# Patient Record
Sex: MALE | Race: BLACK OR AFRICAN AMERICAN | NOT HISPANIC OR LATINO | URBAN - METROPOLITAN AREA
[De-identification: names, ages, dates, MRNs, and addresses within clinical notes are randomized per-mention and may not be internally consistent; named-entity substitution may affect disease eponyms.]

---

## 2019-03-13 ENCOUNTER — EMERGENCY (EMERGENCY)
Facility: HOSPITAL | Age: 59
LOS: 1 days | Discharge: HOME | End: 2019-03-13
Attending: EMERGENCY MEDICINE | Admitting: EMERGENCY MEDICINE
Payer: MEDICAID

## 2019-03-13 VITALS
TEMPERATURE: 97 F | RESPIRATION RATE: 18 BRPM | DIASTOLIC BLOOD PRESSURE: 101 MMHG | SYSTOLIC BLOOD PRESSURE: 178 MMHG | OXYGEN SATURATION: 99 % | WEIGHT: 171.3 LBS | HEART RATE: 60 BPM

## 2019-03-13 DIAGNOSIS — Z90.49 ACQUIRED ABSENCE OF OTHER SPECIFIED PARTS OF DIGESTIVE TRACT: Chronic | ICD-10-CM

## 2019-03-13 DIAGNOSIS — F32.9 MAJOR DEPRESSIVE DISORDER, SINGLE EPISODE, UNSPECIFIED: ICD-10-CM

## 2019-03-13 DIAGNOSIS — N20.0 CALCULUS OF KIDNEY: ICD-10-CM

## 2019-03-13 DIAGNOSIS — F17.210 NICOTINE DEPENDENCE, CIGARETTES, UNCOMPLICATED: ICD-10-CM

## 2019-03-13 DIAGNOSIS — R10.31 RIGHT LOWER QUADRANT PAIN: ICD-10-CM

## 2019-03-13 DIAGNOSIS — E78.5 HYPERLIPIDEMIA, UNSPECIFIED: ICD-10-CM

## 2019-03-13 PROCEDURE — 74176 CT ABD & PELVIS W/O CONTRAST: CPT

## 2019-03-13 PROCEDURE — 96372 THER/PROPH/DIAG INJ SC/IM: CPT

## 2019-03-13 PROCEDURE — 85610 PROTHROMBIN TIME: CPT

## 2019-03-13 PROCEDURE — 87491 CHLMYD TRACH DNA AMP PROBE: CPT

## 2019-03-13 PROCEDURE — 87591 N.GONORRHOEAE DNA AMP PROB: CPT

## 2019-03-13 PROCEDURE — 36415 COLL VENOUS BLD VENIPUNCTURE: CPT

## 2019-03-13 PROCEDURE — 87389 HIV-1 AG W/HIV-1&-2 AB AG IA: CPT

## 2019-03-13 PROCEDURE — 74176 CT ABD & PELVIS W/O CONTRAST: CPT | Mod: 26

## 2019-03-13 PROCEDURE — 85730 THROMBOPLASTIN TIME PARTIAL: CPT

## 2019-03-13 PROCEDURE — 81003 URINALYSIS AUTO W/O SCOPE: CPT

## 2019-03-13 PROCEDURE — 80053 COMPREHEN METABOLIC PANEL: CPT

## 2019-03-13 PROCEDURE — 99284 EMERGENCY DEPT VISIT MOD MDM: CPT | Mod: 25

## 2019-03-13 PROCEDURE — 99284 EMERGENCY DEPT VISIT MOD MDM: CPT

## 2019-03-13 PROCEDURE — 87086 URINE CULTURE/COLONY COUNT: CPT

## 2019-03-13 PROCEDURE — 85025 COMPLETE CBC W/AUTO DIFF WBC: CPT

## 2019-03-13 RX ORDER — CEFTRIAXONE 500 MG/1
250 INJECTION, POWDER, FOR SOLUTION INTRAMUSCULAR; INTRAVENOUS ONCE
Qty: 0 | Refills: 0 | Status: COMPLETED | OUTPATIENT
Start: 2019-03-13 | End: 2019-03-13

## 2019-03-13 RX ORDER — SODIUM CHLORIDE 9 MG/ML
1000 INJECTION INTRAMUSCULAR; INTRAVENOUS; SUBCUTANEOUS ONCE
Qty: 0 | Refills: 0 | Status: COMPLETED | OUTPATIENT
Start: 2019-03-13 | End: 2019-03-13

## 2019-03-13 RX ORDER — AZITHROMYCIN 500 MG/1
1000 TABLET, FILM COATED ORAL ONCE
Qty: 0 | Refills: 0 | Status: COMPLETED | OUTPATIENT
Start: 2019-03-13 | End: 2019-03-13

## 2019-03-13 RX ADMIN — SODIUM CHLORIDE 1000 MILLILITER(S): 9 INJECTION INTRAMUSCULAR; INTRAVENOUS; SUBCUTANEOUS at 18:02

## 2019-03-13 RX ADMIN — CEFTRIAXONE 250 MILLIGRAM(S): 500 INJECTION, POWDER, FOR SOLUTION INTRAMUSCULAR; INTRAVENOUS at 19:55

## 2019-03-13 RX ADMIN — AZITHROMYCIN 1000 MILLIGRAM(S): 500 TABLET, FILM COATED ORAL at 19:55

## 2019-03-13 NOTE — ED ADULT NURSE NOTE - NSIMPLEMENTINTERV_GEN_ALL_ED
Implemented All Universal Safety Interventions:  [place] to call system. Call bell, personal items and telephone within reach. Instruct patient to call for assistance. Room bathroom lighting operational. Non-slip footwear when patient is off stretcher. Physically safe environment: no spills, clutter or unnecessary equipment. Stretcher in lowest position, wheels locked, appropriate side rails in place.

## 2019-03-13 NOTE — ED PROVIDER NOTE - OBJECTIVE STATEMENT
57 y/o male with PMH of prostatectomy, 2 MIs, appendectomy (age 4), TIA presents to ED with 2 wks of LRQ pain. About 2 wks ago, the patient had unprotected sex with a women. The next day, the patient started having abdominal pain, LRQ, radiating to his right testicle and R flank. It has been getting worse. It is constant, 7-8/10, dull pain, worse when laying down and better when he's walking upright. He tried aleve once, which minimally helped. He denies fever, chills, n/v/d, cough. He has increased urinary frequency and has been trying to drink more water. He denies burning or urethral discharge, but does have mild itching of the urethra.     The patient also reports feeling down for the past month. He has decreased appetite, sleep and has been unable to leave his apartment. He stopped showing up for work, and was terminated from his job because of it. He has been unable to enjoy his old hobbies and does not want to talk 59 y/o male with PMH of prostatectomy, 2 MIs, appendectomy (age 4), TIA presents to ED with 2 wks of LRQ pain. About 2 wks ago, the patient had unprotected sex with a women. The next day, the patient started having abdominal pain, LRQ, radiating to his right testicle and R flank. It has been getting worse. It is constant, 7-8/10, dull pain, worse when laying down and better when he's walking upright. He tried aleve once, which minimally helped. He denies fever, chills, n/v/d, cough. He has increased urinary frequency and has been trying to drink more water. He denies burning or urethral discharge, but does have mild itching of the urethra.     The patient also reports feeling down for the past month. He has decreased appetite, sleep and has been unable to leave his apartment. He stopped showing up for work, and was terminated from his job because of it. He has been unable to enjoy his old hobbies and does not want to talk to any of his friends. He denies any suicidal/homocidal ideation.

## 2019-03-13 NOTE — ED PROVIDER NOTE - CHPI ED SYMPTOMS NEG
no abdominal distension/no vomiting/no burning urination/no chills/no blood in stool/no fever/no diarrhea/no nausea

## 2019-03-13 NOTE — ED ADULT NURSE NOTE - OBJECTIVE STATEMENT
Pt. c/o worsening R groin pain radiating to R flank and R testicle and urinary frequency x 2 weeks after having unprotected vaginal sex. Pt. has Hx of PID w/ similar Sx. Pain is constant, pt. last took alleve two days ago w/ partial relief, pain worsens w/ supine positioning, relieved w/ sitting upright or walking. Pt. denies any swelling or erythema of genital area, denies penile discharge. Pt. denies hematuria, burning on urination. Denies N&V&D. Denies fever, chills, body aches. Abd soft, non-tender, non-distended. R groin and R flank tender on palpation. Pt. also reports depression for past month, denies SI or HI. MD Bhagat aware. Pt. states he stopped showing up to work and was recently terminated, has been dis-interested in activities. Pt. has also been noncompliant w/ HTN meds x 3 months.

## 2019-03-13 NOTE — ED PROVIDER NOTE - NSFOLLOWUPCLINICS_GEN_ALL_ED_FT
[facility]  Psychiatry  [address] [place] [address]  Phone: [phone]  Fax:   Follow Up Time:

## 2019-03-13 NOTE — ED ADULT TRIAGE NOTE - CHIEF COMPLAINT QUOTE
pt c/o right flank pain, radiating to the groin and right testicle x 2 weeks. pt reports having unprotected sex prior to symptoms began. also feeling chills and warm but didn't measure his temp at home. denies burning/ blood in urination. pt hypertensive on triage. ekf in progress. endorsed not taking his BP meds for about a month.

## 2019-03-13 NOTE — ED PROVIDER NOTE - PMH
Carpal tunnel syndrome  R hand  Hyperlipidemia    Hypertension Carpal tunnel syndrome  R hand  Hyperlipidemia    Hypertension    Myocardial infarction, unspecified MI type, unspecified artery    TIA (transient ischemic attack)

## 2019-03-15 LAB
C TRACH RRNA SPEC QL NAA+PROBE: SIGNIFICANT CHANGE UP
N GONORRHOEA RRNA SPEC QL NAA+PROBE: SIGNIFICANT CHANGE UP
SPECIMEN SOURCE: SIGNIFICANT CHANGE UP

## 2019-09-16 ENCOUNTER — EMERGENCY (EMERGENCY)
Facility: HOSPITAL | Age: 59
LOS: 0 days | Discharge: HOME | End: 2019-09-16
Attending: EMERGENCY MEDICINE
Payer: MEDICAID

## 2019-09-16 VITALS
RESPIRATION RATE: 14 BRPM | DIASTOLIC BLOOD PRESSURE: 74 MMHG | SYSTOLIC BLOOD PRESSURE: 125 MMHG | OXYGEN SATURATION: 98 % | HEART RATE: 61 BPM | TEMPERATURE: 98 F

## 2019-09-16 VITALS
RESPIRATION RATE: 19 BRPM | HEIGHT: 66 IN | SYSTOLIC BLOOD PRESSURE: 116 MMHG | HEART RATE: 56 BPM | TEMPERATURE: 98 F | DIASTOLIC BLOOD PRESSURE: 75 MMHG | OXYGEN SATURATION: 100 % | WEIGHT: 134.92 LBS

## 2019-09-16 DIAGNOSIS — R07.9 CHEST PAIN, UNSPECIFIED: ICD-10-CM

## 2019-09-16 DIAGNOSIS — Z90.49 ACQUIRED ABSENCE OF OTHER SPECIFIED PARTS OF DIGESTIVE TRACT: Chronic | ICD-10-CM

## 2019-09-16 DIAGNOSIS — I10 ESSENTIAL (PRIMARY) HYPERTENSION: ICD-10-CM

## 2019-09-16 DIAGNOSIS — F17.200 NICOTINE DEPENDENCE, UNSPECIFIED, UNCOMPLICATED: ICD-10-CM

## 2019-09-16 DIAGNOSIS — I50.9 HEART FAILURE, UNSPECIFIED: ICD-10-CM

## 2019-09-16 DIAGNOSIS — F19.19 OTHER PSYCHOACTIVE SUBSTANCE ABUSE WITH UNSPECIFIED PSYCHOACTIVE SUBSTANCE-INDUCED DISORDER: ICD-10-CM

## 2019-09-16 DIAGNOSIS — R07.89 OTHER CHEST PAIN: ICD-10-CM

## 2019-09-16 DIAGNOSIS — J45.909 UNSPECIFIED ASTHMA, UNCOMPLICATED: ICD-10-CM

## 2019-09-16 PROBLEM — G56.00 CARPAL TUNNEL SYNDROME, UNSPECIFIED UPPER LIMB: Chronic | Status: ACTIVE | Noted: 2019-03-13

## 2019-09-16 PROBLEM — G45.9 TRANSIENT CEREBRAL ISCHEMIC ATTACK, UNSPECIFIED: Chronic | Status: ACTIVE | Noted: 2019-03-13

## 2019-09-16 PROBLEM — E78.5 HYPERLIPIDEMIA, UNSPECIFIED: Chronic | Status: ACTIVE | Noted: 2019-03-13

## 2019-09-16 PROBLEM — I21.9 ACUTE MYOCARDIAL INFARCTION, UNSPECIFIED: Chronic | Status: ACTIVE | Noted: 2019-03-13

## 2019-09-16 LAB
ALBUMIN SERPL ELPH-MCNC: 3 G/DL — LOW (ref 3.3–5)
ALP SERPL-CCNC: 57 U/L — SIGNIFICANT CHANGE UP (ref 40–120)
ALT FLD-CCNC: 13 U/L — SIGNIFICANT CHANGE UP (ref 12–78)
AMPHET UR-MCNC: NEGATIVE — SIGNIFICANT CHANGE UP
ANION GAP SERPL CALC-SCNC: 6 MMOL/L — SIGNIFICANT CHANGE UP (ref 5–17)
APTT BLD: 29.3 SEC — SIGNIFICANT CHANGE UP (ref 28.5–37)
AST SERPL-CCNC: 18 U/L — SIGNIFICANT CHANGE UP (ref 15–37)
BARBITURATES UR SCN-MCNC: NEGATIVE — SIGNIFICANT CHANGE UP
BENZODIAZ UR-MCNC: NEGATIVE — SIGNIFICANT CHANGE UP
BILIRUB SERPL-MCNC: 0.3 MG/DL — SIGNIFICANT CHANGE UP (ref 0.2–1.2)
BUN SERPL-MCNC: 19 MG/DL — SIGNIFICANT CHANGE UP (ref 7–23)
CALCIUM SERPL-MCNC: 8.1 MG/DL — LOW (ref 8.5–10.1)
CHLORIDE SERPL-SCNC: 109 MMOL/L — HIGH (ref 96–108)
CK MB BLD-MCNC: <0.5 % — SIGNIFICANT CHANGE UP (ref 0–3.5)
CK MB CFR SERPL CALC: <1 NG/ML — SIGNIFICANT CHANGE UP (ref 0.5–3.6)
CK SERPL-CCNC: 202 U/L — SIGNIFICANT CHANGE UP (ref 26–308)
CO2 SERPL-SCNC: 26 MMOL/L — SIGNIFICANT CHANGE UP (ref 22–31)
COCAINE METAB.OTHER UR-MCNC: POSITIVE — SIGNIFICANT CHANGE UP
CREAT SERPL-MCNC: 1.71 MG/DL — HIGH (ref 0.5–1.3)
ETHANOL SERPL-MCNC: <10 MG/DL — SIGNIFICANT CHANGE UP (ref 0–10)
GLUCOSE SERPL-MCNC: 96 MG/DL — SIGNIFICANT CHANGE UP (ref 70–99)
HCT VFR BLD CALC: 37.6 % — LOW (ref 39–50)
HGB BLD-MCNC: 11.9 G/DL — LOW (ref 13–17)
INR BLD: 1.14 RATIO — SIGNIFICANT CHANGE UP (ref 0.88–1.16)
MCHC RBC-ENTMCNC: 26.4 PG — LOW (ref 27–34)
MCHC RBC-ENTMCNC: 31.6 GM/DL — LOW (ref 32–36)
MCV RBC AUTO: 83.6 FL — SIGNIFICANT CHANGE UP (ref 80–100)
METHADONE UR-MCNC: NEGATIVE — SIGNIFICANT CHANGE UP
NRBC # BLD: 0 /100 WBCS — SIGNIFICANT CHANGE UP (ref 0–0)
NT-PROBNP SERPL-SCNC: 95 PG/ML — SIGNIFICANT CHANGE UP (ref 0–125)
OPIATES UR-MCNC: NEGATIVE — SIGNIFICANT CHANGE UP
PCP SPEC-MCNC: SIGNIFICANT CHANGE UP
PCP UR-MCNC: NEGATIVE — SIGNIFICANT CHANGE UP
PLATELET # BLD AUTO: 250 K/UL — SIGNIFICANT CHANGE UP (ref 150–400)
POTASSIUM SERPL-MCNC: 3.8 MMOL/L — SIGNIFICANT CHANGE UP (ref 3.5–5.3)
POTASSIUM SERPL-SCNC: 3.8 MMOL/L — SIGNIFICANT CHANGE UP (ref 3.5–5.3)
PROT SERPL-MCNC: 6.7 GM/DL — SIGNIFICANT CHANGE UP (ref 6–8.3)
PROTHROM AB SERPL-ACNC: 12.8 SEC — SIGNIFICANT CHANGE UP (ref 10–12.9)
RBC # BLD: 4.5 M/UL — SIGNIFICANT CHANGE UP (ref 4.2–5.8)
RBC # FLD: 13.9 % — SIGNIFICANT CHANGE UP (ref 10.3–14.5)
SODIUM SERPL-SCNC: 141 MMOL/L — SIGNIFICANT CHANGE UP (ref 135–145)
THC UR QL: NEGATIVE — SIGNIFICANT CHANGE UP
TROPONIN I SERPL-MCNC: <.015 NG/ML — SIGNIFICANT CHANGE UP (ref 0.01–0.04)
TROPONIN I SERPL-MCNC: <.015 NG/ML — SIGNIFICANT CHANGE UP (ref 0.01–0.04)
WBC # BLD: 7.3 K/UL — SIGNIFICANT CHANGE UP (ref 3.8–10.5)
WBC # FLD AUTO: 7.3 K/UL — SIGNIFICANT CHANGE UP (ref 3.8–10.5)

## 2019-09-16 PROCEDURE — 99285 EMERGENCY DEPT VISIT HI MDM: CPT

## 2019-09-16 PROCEDURE — 71045 X-RAY EXAM CHEST 1 VIEW: CPT | Mod: 26

## 2019-09-16 PROCEDURE — 93010 ELECTROCARDIOGRAM REPORT: CPT

## 2019-09-16 RX ORDER — IPRATROPIUM/ALBUTEROL SULFATE 18-103MCG
3 AEROSOL WITH ADAPTER (GRAM) INHALATION
Refills: 0 | Status: COMPLETED | OUTPATIENT
Start: 2019-09-16 | End: 2019-09-16

## 2019-09-16 RX ORDER — METOCLOPRAMIDE HCL 10 MG
10 TABLET ORAL ONCE
Refills: 0 | Status: COMPLETED | OUTPATIENT
Start: 2019-09-16 | End: 2019-09-16

## 2019-09-16 RX ORDER — ASPIRIN/CALCIUM CARB/MAGNESIUM 324 MG
325 TABLET ORAL ONCE
Refills: 0 | Status: COMPLETED | OUTPATIENT
Start: 2019-09-16 | End: 2019-09-16

## 2019-09-16 RX ORDER — ALBUTEROL 90 UG/1
2 AEROSOL, METERED ORAL
Qty: 1 | Refills: 0
Start: 2019-09-16 | End: 2019-10-15

## 2019-09-16 RX ORDER — PANTOPRAZOLE SODIUM 20 MG/1
40 TABLET, DELAYED RELEASE ORAL ONCE
Refills: 0 | Status: COMPLETED | OUTPATIENT
Start: 2019-09-16 | End: 2019-09-16

## 2019-09-16 RX ADMIN — Medication 3 MILLILITER(S): at 07:50

## 2019-09-16 RX ADMIN — Medication 3 MILLILITER(S): at 07:40

## 2019-09-16 RX ADMIN — Medication 10 MILLIGRAM(S): at 08:10

## 2019-09-16 RX ADMIN — Medication 325 MILLIGRAM(S): at 08:10

## 2019-09-16 RX ADMIN — Medication 125 MILLIGRAM(S): at 08:10

## 2019-09-16 RX ADMIN — PANTOPRAZOLE SODIUM 40 MILLIGRAM(S): 20 TABLET, DELAYED RELEASE ORAL at 08:10

## 2019-09-16 RX ADMIN — Medication 3 MILLILITER(S): at 08:16

## 2019-09-16 NOTE — ED PROVIDER NOTE - OBJECTIVE STATEMENT
59 years old male here c/o nasal congestions productive coughs, sharp chest pain with cough wheezing for 3 days. increases wheezing this morning. Pt sts he feels like this asthma attack in coming. Pt also sts he has hx of hypertension, chf, MI, but not seeing a pmd not taking any medications for 6 months. Pt also admits using cracks this past weekend last use was 2 days ago. Pt denies recent trauma, headache, dizziness, blurred visions, lights sensitivities, neck/back pain, focal/distal weakness or numbness, nausea, vomiting, fever, chills, abd pain, dysuria or irregular bowel movements. Pt also c/o note able to sleep for last couple of days.

## 2019-09-16 NOTE — ED PROVIDER NOTE - PMH
Asthma    CAD (coronary artery disease)    CHF (congestive heart failure)    Hypertension    Substance abuse

## 2019-09-16 NOTE — ED PROVIDER NOTE - PATIENT PORTAL LINK FT
You can access the FollowMyHealth Patient Portal offered by [facility] by registering at the following website: [email or web address]. By joining [employer]’s FollowMyHealth portal, you will also be able to view your health information using other applications (apps) compatible with our system.

## 2019-09-16 NOTE — ED PROVIDER NOTE - PROGRESS NOTE DETAILS
Pt is alert and oriented x 3 sts he is breathing much better now no expiratory wheezing bilaterally denies headache, dizziness, sob, chest pain, nausea, vomiting, abd pain. Pt is eating and tolerating breakfast. 2nd tropo is pending at 11:00 am. tropo x 2 negative. Pt has been very comfortable ate and tolerated lunch now Pt is given and explained all test reports and advised to follow up with Dr. Chaparro and return if symptoms persist or worsen. breath sounds are clear equal bilaterally.

## 2019-09-16 NOTE — ED PROVIDER NOTE - CONSTITUTIONAL, MLM
normal... Well appearing, well nourished, awake, alert, oriented to person, place, time/situation and in no apparent distress. Speaking in clear full sentences no nasal flaring no shoulders retractions no diaphoresis, not holding his head/chest/abdomen, appears comfortable sitting up in the stretcher in a bright light room

## 2019-09-16 NOTE — ED ADULT NURSE REASSESSMENT NOTE - NS ED NURSE REASSESS COMMENT FT1
Assuming care from previous RN Alicia, pt is A&OX4, pt safety maintained, denies SOB, resp even and unlabored, skin warm and dry, color is normal for race, denies pain/n/v, pt aware of plan of care and proficiency determined by successful teach back demonstration. Pt does not appear to be in any distress at this time.

## 2019-09-16 NOTE — ED ADULT TRIAGE NOTE - CHIEF COMPLAINT QUOTE
Patient BIBA: patient reports "chest pain and cough for 3 days. I have been smoking a copious amount of crack. My throat and head hurt too."

## 2019-09-16 NOTE — ED PROVIDER NOTE - NONTENDER LOCATION
left upper quadrant/right upper quadrant/umbilical/periumbilical/left costovertebral angle/left lower quadrant/suprapubic/right costovertebral angle/right lower quadrant

## 2019-09-16 NOTE — ED PROVIDER NOTE - CARE PLAN
Principal Discharge DX:	Asthma  Secondary Diagnosis:	Chest pain, unspecified  Secondary Diagnosis:	Substance abuse

## 2019-09-16 NOTE — ED ADULT NURSE NOTE - HOW OFTEN DO YOU HAVE SIX OR MORE DRINKS ON ONE OCCASION?
Never Thalidomide Counseling: I discussed with the patient the risks of thalidomide including but not limited to birth defects, anxiety, weakness, chest pain, dizziness, cough and severe allergy.

## 2019-09-16 NOTE — ED ADULT NURSE NOTE - OBJECTIVE STATEMENT
Pt presents w/ complaints of shooting sternal chest pain onset 3 hours ago. History of CVA 4 years Pt presents w/ complaints of shooting sternal chest pain onset 3 hours ago. History of CVA 4 years ago, asthma and CHF. Pt reports that his chest pain is aggravated by coughing. He also reports stuffy nose and headache

## 2019-10-01 ENCOUNTER — OUTPATIENT (OUTPATIENT)
Dept: OUTPATIENT SERVICES | Facility: HOSPITAL | Age: 59
LOS: 1 days | End: 2019-10-01
Payer: MEDICAID

## 2019-10-01 DIAGNOSIS — Z90.49 ACQUIRED ABSENCE OF OTHER SPECIFIED PARTS OF DIGESTIVE TRACT: Chronic | ICD-10-CM

## 2019-10-01 PROCEDURE — G9001: CPT

## 2019-10-11 DIAGNOSIS — Z71.89 OTHER SPECIFIED COUNSELING: ICD-10-CM

## 2019-10-11 PROBLEM — I50.9 HEART FAILURE, UNSPECIFIED: Chronic | Status: ACTIVE | Noted: 2019-09-16

## 2019-10-11 PROBLEM — F19.10 OTHER PSYCHOACTIVE SUBSTANCE ABUSE, UNCOMPLICATED: Chronic | Status: ACTIVE | Noted: 2019-09-16

## 2019-10-11 PROBLEM — I25.10 ATHEROSCLEROTIC HEART DISEASE OF NATIVE CORONARY ARTERY WITHOUT ANGINA PECTORIS: Chronic | Status: ACTIVE | Noted: 2019-09-16

## 2019-10-11 PROBLEM — J45.909 UNSPECIFIED ASTHMA, UNCOMPLICATED: Chronic | Status: ACTIVE | Noted: 2019-09-16

## 2019-10-11 PROBLEM — I10 ESSENTIAL (PRIMARY) HYPERTENSION: Chronic | Status: ACTIVE | Noted: 2019-09-16

## 2020-05-16 ENCOUNTER — EMERGENCY (EMERGENCY)
Facility: HOSPITAL | Age: 60
LOS: 0 days | Discharge: PSYCHIATRIC OR SUBSTANCE USE FACILITY | End: 2020-05-17
Attending: EMERGENCY MEDICINE
Payer: MEDICAID

## 2020-05-16 VITALS
TEMPERATURE: 97 F | WEIGHT: 149.91 LBS | HEART RATE: 50 BPM | SYSTOLIC BLOOD PRESSURE: 119 MMHG | HEIGHT: 66 IN | RESPIRATION RATE: 16 BRPM | DIASTOLIC BLOOD PRESSURE: 69 MMHG | OXYGEN SATURATION: 99 %

## 2020-05-16 DIAGNOSIS — Z90.49 ACQUIRED ABSENCE OF OTHER SPECIFIED PARTS OF DIGESTIVE TRACT: Chronic | ICD-10-CM

## 2020-05-16 LAB
ALBUMIN SERPL ELPH-MCNC: 3 G/DL — LOW (ref 3.3–5)
ALP SERPL-CCNC: 62 U/L — SIGNIFICANT CHANGE UP (ref 40–120)
ALT FLD-CCNC: 18 U/L — SIGNIFICANT CHANGE UP (ref 12–78)
AMPHET UR-MCNC: NEGATIVE — SIGNIFICANT CHANGE UP
ANION GAP SERPL CALC-SCNC: 9 MMOL/L — SIGNIFICANT CHANGE UP (ref 5–17)
APAP SERPL-MCNC: < 2 UG/ML (ref 10–30)
APPEARANCE UR: CLEAR — SIGNIFICANT CHANGE UP
AST SERPL-CCNC: 18 U/L — SIGNIFICANT CHANGE UP (ref 15–37)
BACTERIA # UR AUTO: ABNORMAL
BARBITURATES UR SCN-MCNC: NEGATIVE — SIGNIFICANT CHANGE UP
BASOPHILS # BLD AUTO: 0.02 K/UL — SIGNIFICANT CHANGE UP (ref 0–0.2)
BASOPHILS NFR BLD AUTO: 0.4 % — SIGNIFICANT CHANGE UP (ref 0–2)
BENZODIAZ UR-MCNC: NEGATIVE — SIGNIFICANT CHANGE UP
BILIRUB SERPL-MCNC: 0.3 MG/DL — SIGNIFICANT CHANGE UP (ref 0.2–1.2)
BILIRUB UR-MCNC: NEGATIVE — SIGNIFICANT CHANGE UP
BUN SERPL-MCNC: 21 MG/DL — SIGNIFICANT CHANGE UP (ref 7–23)
CALCIUM SERPL-MCNC: 8.4 MG/DL — LOW (ref 8.5–10.1)
CHLORIDE SERPL-SCNC: 107 MMOL/L — SIGNIFICANT CHANGE UP (ref 96–108)
CO2 SERPL-SCNC: 24 MMOL/L — SIGNIFICANT CHANGE UP (ref 22–31)
COCAINE METAB.OTHER UR-MCNC: POSITIVE — SIGNIFICANT CHANGE UP
COLOR SPEC: YELLOW — SIGNIFICANT CHANGE UP
COMMENT - URINE: SIGNIFICANT CHANGE UP
CREAT SERPL-MCNC: 1.78 MG/DL — HIGH (ref 0.5–1.3)
DIFF PNL FLD: NEGATIVE — SIGNIFICANT CHANGE UP
EOSINOPHIL # BLD AUTO: 0.23 K/UL — SIGNIFICANT CHANGE UP (ref 0–0.5)
EOSINOPHIL NFR BLD AUTO: 4.1 % — SIGNIFICANT CHANGE UP (ref 0–6)
EPI CELLS # UR: SIGNIFICANT CHANGE UP
ETHANOL SERPL-MCNC: <10 MG/DL — SIGNIFICANT CHANGE UP (ref 0–10)
GLUCOSE SERPL-MCNC: 144 MG/DL — HIGH (ref 70–99)
GLUCOSE UR QL: 100 MG/DL
GRAN CASTS # UR COMP ASSIST: ABNORMAL /LPF
HCT VFR BLD CALC: 40.8 % — SIGNIFICANT CHANGE UP (ref 39–50)
HGB BLD-MCNC: 13 G/DL — SIGNIFICANT CHANGE UP (ref 13–17)
HIV 1 & 2 AB SERPL IA.RAPID: SIGNIFICANT CHANGE UP
HYALINE CASTS # UR AUTO: ABNORMAL /LPF
IMM GRANULOCYTES NFR BLD AUTO: 0.2 % — SIGNIFICANT CHANGE UP (ref 0–1.5)
KETONES UR-MCNC: NEGATIVE — SIGNIFICANT CHANGE UP
LEUKOCYTE ESTERASE UR-ACNC: NEGATIVE — SIGNIFICANT CHANGE UP
LYMPHOCYTES # BLD AUTO: 1.75 K/UL — SIGNIFICANT CHANGE UP (ref 1–3.3)
LYMPHOCYTES # BLD AUTO: 31.3 % — SIGNIFICANT CHANGE UP (ref 13–44)
MCHC RBC-ENTMCNC: 27.3 PG — SIGNIFICANT CHANGE UP (ref 27–34)
MCHC RBC-ENTMCNC: 31.9 GM/DL — LOW (ref 32–36)
MCV RBC AUTO: 85.5 FL — SIGNIFICANT CHANGE UP (ref 80–100)
METHADONE UR-MCNC: NEGATIVE — SIGNIFICANT CHANGE UP
MONOCYTES # BLD AUTO: 0.36 K/UL — SIGNIFICANT CHANGE UP (ref 0–0.9)
MONOCYTES NFR BLD AUTO: 6.4 % — SIGNIFICANT CHANGE UP (ref 2–14)
NEUTROPHILS # BLD AUTO: 3.22 K/UL — SIGNIFICANT CHANGE UP (ref 1.8–7.4)
NEUTROPHILS NFR BLD AUTO: 57.6 % — SIGNIFICANT CHANGE UP (ref 43–77)
NITRITE UR-MCNC: NEGATIVE — SIGNIFICANT CHANGE UP
NRBC # BLD: 0 /100 WBCS — SIGNIFICANT CHANGE UP (ref 0–0)
OPIATES UR-MCNC: NEGATIVE — SIGNIFICANT CHANGE UP
PCP SPEC-MCNC: SIGNIFICANT CHANGE UP
PCP UR-MCNC: NEGATIVE — SIGNIFICANT CHANGE UP
PH UR: 5 — SIGNIFICANT CHANGE UP (ref 5–8)
PLATELET # BLD AUTO: 246 K/UL — SIGNIFICANT CHANGE UP (ref 150–400)
POTASSIUM SERPL-MCNC: 4 MMOL/L — SIGNIFICANT CHANGE UP (ref 3.5–5.3)
POTASSIUM SERPL-SCNC: 4 MMOL/L — SIGNIFICANT CHANGE UP (ref 3.5–5.3)
PROT SERPL-MCNC: 6.9 GM/DL — SIGNIFICANT CHANGE UP (ref 6–8.3)
PROT UR-MCNC: 15 MG/DL
RBC # BLD: 4.77 M/UL — SIGNIFICANT CHANGE UP (ref 4.2–5.8)
RBC # FLD: 13.9 % — SIGNIFICANT CHANGE UP (ref 10.3–14.5)
SALICYLATES SERPL-MCNC: <1.7 MG/DL — LOW (ref 2.8–20)
SODIUM SERPL-SCNC: 140 MMOL/L — SIGNIFICANT CHANGE UP (ref 135–145)
SP GR SPEC: 1.02 — SIGNIFICANT CHANGE UP (ref 1.01–1.02)
THC UR QL: NEGATIVE — SIGNIFICANT CHANGE UP
TSH SERPL-MCNC: 0.69 UIU/ML — SIGNIFICANT CHANGE UP (ref 0.36–3.74)
UROBILINOGEN FLD QL: 1 MG/DL
WBC # BLD: 5.59 K/UL — SIGNIFICANT CHANGE UP (ref 3.8–10.5)
WBC # FLD AUTO: 5.59 K/UL — SIGNIFICANT CHANGE UP (ref 3.8–10.5)
WBC UR QL: SIGNIFICANT CHANGE UP

## 2020-05-16 PROCEDURE — 99285 EMERGENCY DEPT VISIT HI MDM: CPT

## 2020-05-16 PROCEDURE — 93010 ELECTROCARDIOGRAM REPORT: CPT

## 2020-05-16 PROCEDURE — 90792 PSYCH DIAG EVAL W/MED SRVCS: CPT | Mod: 95

## 2020-05-16 RX ORDER — CEFTRIAXONE 500 MG/1
250 INJECTION, POWDER, FOR SOLUTION INTRAMUSCULAR; INTRAVENOUS ONCE
Refills: 0 | Status: COMPLETED | OUTPATIENT
Start: 2020-05-16 | End: 2020-05-16

## 2020-05-16 RX ORDER — AZITHROMYCIN 500 MG/1
1000 TABLET, FILM COATED ORAL ONCE
Refills: 0 | Status: COMPLETED | OUTPATIENT
Start: 2020-05-16 | End: 2020-05-16

## 2020-05-16 RX ORDER — PENICILLIN G BENZATHINE 1200000 [IU]/2ML
2.4 INJECTION, SUSPENSION INTRAMUSCULAR ONCE
Refills: 0 | Status: COMPLETED | OUTPATIENT
Start: 2020-05-16 | End: 2020-05-16

## 2020-05-16 RX ADMIN — AZITHROMYCIN 1000 MILLIGRAM(S): 500 TABLET, FILM COATED ORAL at 19:55

## 2020-05-16 RX ADMIN — PENICILLIN G BENZATHINE 2.4 MILLION UNIT(S): 1200000 INJECTION, SUSPENSION INTRAMUSCULAR at 19:56

## 2020-05-16 RX ADMIN — CEFTRIAXONE 250 MILLIGRAM(S): 500 INJECTION, POWDER, FOR SOLUTION INTRAMUSCULAR; INTRAVENOUS at 19:55

## 2020-05-16 NOTE — ED PROVIDER NOTE - NS ED ROS FT
No fever/chills, No photophobia/eye pain/changes in vision, No ear pain/sore throat/dysphagia, No chest pain/palpitations, no SOB/cough/wheeze/stridor, No abdominal pain, No N/V/D, +itch on urination, +penile lesion, no dysuria/frequency/discharge, No neck/back pain, no rash, no changes in neurological status/function.

## 2020-05-16 NOTE — ED PROVIDER NOTE - NSRISKOFTRANSFER_ED_A_ED
Transportation Risk (There is always a risk of traffic delays resulting in deterioration of condition.)/Death or Disability/Increased Pain/Deterioration of Condition En Route

## 2020-05-16 NOTE — ED ADULT NURSE NOTE - NSIMPLEMENTINTERV_GEN_ALL_ED
Implemented All Universal Safety Interventions:  [place] to call system. Call bell, personal items and telephone within reach. Instruct patient to call for assistance. Room bathroom lighting operational. Non-slip footwear when patient is off stretcher. Physically safe environment: no spills, clutter or unnecessary equipment. Stretcher in lowest position, wheels locked, appropriate side rails in place.

## 2020-05-16 NOTE — ED ADULT NURSE NOTE - PMH
Asthma    CAD (coronary artery disease)    Carpal tunnel syndrome  R hand  CHF (congestive heart failure)    Hyperlipidemia    Hypertension    Hypertension    Myocardial infarction, unspecified MI type, unspecified artery    Substance abuse    TIA (transient ischemic attack)

## 2020-05-16 NOTE — ED PROVIDER NOTE - PROGRESS NOTE DETAILS
pt doing well. no complaints. covid resulted neg. tp looking for bed at . will fax legals. Patient signed out by Dr. cardenas pending bed search.  Accepted to [facility].  Spoke with resident, again advised medicine consult for chronic medical issues which require management, however no acute issues which require medicine admission at this time.

## 2020-05-16 NOTE — ED ADULT NURSE REASSESSMENT NOTE - NS ED NURSE REASSESS COMMENT FT1
Report rec'd. Pt calm and cooperative vss at this time. Awaiting telepsych eval, 1;1 maintained for safety, will follow........................................................................................ERIC RN

## 2020-05-16 NOTE — ED ADULT NURSE NOTE - CHIEF COMPLAINT QUOTE
pt states he has been feeling weakness and depressed for three days and wants to speak to a psychiatrist  . pt stats he is hungry. he also expressed he has no money and has not been taking  his hypertension medication for two months

## 2020-05-16 NOTE — ED PROVIDER NOTE - CARE PLAN
Principal Discharge DX:	Suicidal ideation Niacinamide Counseling: I recommended taking niacin or niacinamide, also know as vitamin B3, twice daily. Recent evidence suggests that taking vitamin B3 (500 mg twice daily) can reduce the risk of actinic keratoses and non-melanoma skin cancers. Side effects of vitamin B3 include flushing and headache.

## 2020-05-16 NOTE — ED BEHAVIORAL HEALTH NOTE - BEHAVIORAL HEALTH NOTE
===================  PRE-HOSPITAL COURSE  ===================  SOURCE:  RN, Chart  DETAILS:  Patient self-presented to the ED.    ============  ED COURSE   ============  SOURCE: BATOOL Mcgarry  ARRIVAL: Patient self-presents to the ED  BELONGINGS: Clothing, eyeglasses  BEHAVIOR: RN reports that patient has been calm and cooperative in the ED. She reports patient was compliant with blood draws, provided urine without issue, and changed into hospital gown willingly. RN reports that patient has not been agitated, aggressive, or violent in the ED. He is observed resting in the stretcher and interacting appropriately with staff. Patient ate full meal in the ED. RN reports that during nursing assessment, patient reported feeling depressed for a while, and that he has had thoughts of hurting himself with razor blade and hurting other people as well. RN reports that patient reports he did not make any attempts to hurt himself or others. RN reports patient states he sometimes hears his mother’s voice and that she passed away recently, but states he does not appear internally preoccupied. Patient’s speech is clear and coherent, thought process is linear. RN reports patient’s hygiene is fair, not malodorous or disheveled.   TREATMENT: Patient reported to RN/MD that he had unprotected sex recently, so he was given antibiotics and tested for STIs. Received Zithromax 1000mg PO, Rocephin 250mg IM, and Bicillin L-A 2.24 million units IM   VISITORS: Patient is unaccompanied by social or family supports in the ED.     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    ========================  PATIENT DEMOGRAPHICS: Patient is a 60 y/o male, domiciled in room of private apartment with 3 roommates, , non-caregiver, and   ========================    HPI  BASELINE FUNCTIONING: Patient is domiciled in private apartment with 3 roommates, is estranged from all family, and his main social supports are his peers and caseworkers at [employer]. Patient is not currently linked with outpatient therapist or psychiatrist. Collateral reports patient does not have mood/psychotic/anxiety sx, substance use, thoughts of harming self/others at baseline.    DATE HPI STARTED: Unknown to collateral, possibly 2 months ago  DECOMPENSATION: Collateral reports he last spoke with patient 2 days ago and left a voicemail for patient today, is surprised that patient self-presented to the ED this evening. 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 After a week of working there (~2 months ago), the facility shut down and this was upsetting to patient. Collateral reports that the loss of work was challenging for patient but he believed that his mood was improving and he was doing well for the last 2-3 weeks based on their phone conversations. He states that patient recently received food stamp benefits and has also just gotten a new phone. Collateral reports he attempted to call patient today to let him know that the residential facility is reopening so they would like to bring him back as a , but he was unable to reach patient. Collateral denies any recent statements of SI/HI, AH/VH from patient.   SUICIDALITY: Collateral reports no recent SI, SA or self-harm that he is aware of  VIOLENCE: Collateral reports no violence/aggression that he is aware of  SUBSTANCE: Collateral reports he does not know if patient has used any alcohol/substances recently.      Collateral not able to provide further details.

## 2020-05-16 NOTE — ED PROVIDER NOTE - CLINICAL SUMMARY MEDICAL DECISION MAKING FREE TEXT BOX
Patient comes to ER for depression, suicidal ideation, STI risk.  VSS.  Bradycardia noted on other ER visits, BP stable, patient asymptomatic.  Labs at baseline, has mild CKD.  Patient determined to need inpatient psych admission, however COVID swab pending prior to bed search, tests note to return until tomorrow afternoon.  Patient given ppx for GC/chlamydia/syphillis, HIV negative.  Recommend medicine consult on admission to resume home meds.  Patient to resting in ER until COVID swabs result and bed search can ensue, zoloft 50 given per psych rec, CIWA currently 0.  Patient signed out to incoming physician Dr. Harp.  All decisions regarding the progression of care will be made at their discretion. Patient comes to ER for depression, suicidal ideation, STI risk.  VSS.  Bradycardia noted on other ER visits, BP stable, patient asymptomatic.  Labs at baseline, has mild CKD.  Unable to reach collateral source.  Patient determined to need inpatient psych admission, however COVID swab pending prior to bed search, tests note to return until tomorrow afternoon.  Patient given ppx for GC/chlamydia/syphillis, HIV negative.  Recommend medicine consult on admission to resume home meds.  Patient to resting in ER until COVID swabs result and bed search can ensue, zoloft 50 given per psych rec, CIWA currently 0.  Patient signed out to incoming physician Dr. Harp.  All decisions regarding the progression of care will be made at their discretion.

## 2020-05-16 NOTE — ED ADULT NURSE NOTE - OBJECTIVE STATEMENT
pt received c/o weakness and depression. pt states he has not ate in four days because he ran out of food in his home. pt states he had thoughts for hurting himself and though of taking his life with a razor blade and thought "of taking other with him". pt states we wants to be tested for STI and he engaged in risky sexual behavior over the past few weeks.

## 2020-05-16 NOTE — ED PROVIDER NOTE - PHYSICAL EXAMINATION
Gen: Alert, NAD, well appearing  Head: NC, AT, PERRL, EOMI, normal lids/conjunctiva  ENT: normal hearing, patent oropharynx without erythema/exudate, uvula midline  Neck: +supple, no tenderness/meningismus/JVD, +Trachea midline  Pulm: Bilateral BS, normal resp effort, no wheeze/stridor/retractions  CV: RRR, no M/R/G, +dist pulses  Abd: soft, NT/ND, Negative [place] signs, +BS, no palpable masses  Mskel: no edema/erythema/cyanosis  Skin: no rash, warm/dry  Neuro: AAOx3, no apparent sensory/motor deficits, coordination intact   : No visual penial discharge, small crust lesion on distal aspect of penial shaft on the RT Gen: Alert, NAD, well appearing  Head: NC, AT, PERRL, EOMI, normal lids/conjunctiva  ENT: normal hearing, patent oropharynx without erythema/exudate, uvula midline  Neck: +supple, no tenderness/meningismus/JVD, +Trachea midline  Pulm: Bilateral BS, normal resp effort, no wheeze/stridor/retractions  CV: RRR, no M/R/G, +dist pulses  Abd: soft, NT/ND, Negative [place] signs, +BS, no palpable masses  Mskel: no edema/erythema/cyanosis  Skin: no rash, warm/dry  Neuro: AAOx3, no apparent sensory/motor deficits, coordination intact   : No visual penial discharge, small crusted lesion on distal aspect of penial shaft on the RT, normal testicles

## 2020-05-16 NOTE — ED ADULT TRIAGE NOTE - CHIEF COMPLAINT QUOTE
pt states he has been feeling weakness and depressed for three days . pt stats he is hungry. he also expressed he has no money and has not been taking  his hypertension medication for two months pt states he has been feeling weakness and depressed for three days and wants to speak to a psychiatrist  . pt stats he is hungry. he also expressed he has no money and has not been taking  his hypertension medication for two months

## 2020-05-16 NOTE — ED PROVIDER NOTE - OBJECTIVE STATEMENT
Triage Nurse Notes: "pt states he has been feeling weakness and depressed for three days and wants to speak to a psychiatrist  . pt stats he is hungry. he also expressed he has no money and has not been taking  his hypertension medication for two months  weakness, depression"    Pertinent PMH/PSH/FHx/SHx and Review of Systems contained within:     58 y/o M with a PMHx of Asthma, Substance abuse, CAD, CHF, HTN, and HLD presents to the ED c/o worsening depression over the last year. Patient states he has been eating, drinking and sleeping poorly for the last 4 days and currently feels weak. Patient is a ex  and lives alone with 3 other veterans who have not been at home. Patient reports having thoughts about silting his wrist and taking a hot bath. He also reports engaging in unsafe sexual intercourse with prostitutes. Patient states feeling an itch on urination and notice a small lesion on his penis. Patient is also endorsing some agoraphobia, stating he attempts to leave the house but feels he has to go back inside. Patient has been unemployed for the past year but prior  worked as a cased manager. Denies history of psychiatric illness, fever, chills, cough, joint pain or difficulty breathing. Patient will talk to psych today. Triage Nurse Notes: "pt states he has been feeling weakness and depressed for three days and wants to speak to a psychiatrist  . pt stats he is hungry. he also expressed he has no money and has not been taking  his hypertension medication for two months  weakness, depression"    Pertinent PMH/PSH/FHx/SHx and Review of Systems contained within:     58 y/o M with a PMHx of Asthma, Substance abuse, CAD, CHF, HTN, and HLD presents to the ED c/o worsening depression over the last year. Patient states he has been eating, drinking and sleeping poorly for the last 4 days and currently feels diffusely weak. Patient is a ex ,  20 years, and lives alone with 3 other veterans who he says have not been at home. Patient reports having thoughts about slitting his wrists and taking a hot bath. He also reports engaging in unsafe sexual intercourse with prostitutes. Patient states feeling an itch on urination and notice a small painless lesion on his penis. Patient is also endorsing some agoraphobia, stating he attempts to leave the house but feels he has to go back inside. Patient has been unemployed for the past year but prior  worked as a cased manager. Denies history of psychiatric illness, fever, chills, cough, joint pain or difficulty breathing. He feels that being alone and being unemployed have gotten to him.  Patient wishes to talk to psych today. Triage Nurse Notes: "pt states he has been feeling weakness and depressed for three days and wants to speak to a psychiatrist  . pt stats he is hungry. he also expressed he has no money and has not been taking  his hypertension medication for two months  weakness, depression"    Pertinent PMH/PSH/FHx/SHx and Review of Systems contained within:     60 y/o M with a PMHx of Asthma, Substance abuse, CAD, CHF, HTN, and HLD presents to the ED c/o worsening depression over the last year. Patient states he has been eating, drinking and sleeping poorly for the last 4 days and currently feels diffusely weak. Patient is a ex ,  20 years, and lives alone with 3 other veterans who he says have not been at home. Patient reports having thoughts about slitting his wrists and taking a hot bath. He also reports engaging in unsafe sexual intercourse with prostitutes. Patient states feeling an itch on urination and notice a small painless lesion on his penis. Patient is also endorsing some agoraphobia, stating he attempts to leave the house but feels he has to go back inside. Patient has been unemployed for the past year but prior  worked as a cased manager. Denies history of psychiatric illness, fever, chills, cough, joint pain or difficulty breathing. He feels that being alone and being unemployed have gotten to him.  He has not been on his usual meds for the last 3 months because he ran out and does not have a primary care physician.   Patient wishes to talk to psych today. Triage Nurse Notes: "pt states he has been feeling weakness and depressed for three days and wants to speak to a psychiatrist  . pt stats he is hungry. he also expressed he has no money and has not been taking  his hypertension medication for two months  weakness, depression"    Pertinent PMH/PSH/FHx/SHx and Review of Systems contained within:     60 y/o M with a PMHx of Asthma, Substance abuse, remote prostate cancer, CAD, CHF, HTN, and HLD presents to the ED c/o worsening depression over the last year. Patient states he has been eating, drinking and sleeping poorly for the last 4 days and currently feels diffusely weak. Patient is a ex ,  20 years, and lives alone with 3 other veterans who he says have not been at home. Patient reports having thoughts about slitting his wrists and taking a hot bath. He also reports engaging in unsafe sexual intercourse with prostitutes. Patient states feeling an itch on urination and notice a small painless lesion on his penis. Patient is also endorsing some agoraphobia, stating he attempts to leave the house but feels he has to go back inside. Patient has been unemployed for the past year but prior  worked as a cased manager. Denies history of psychiatric illness, fever, chills, cough, joint pain or difficulty breathing. He feels that being alone and being unemployed have gotten to him.  He has not been on his usual meds for the last 3 months because he ran out and does not have a primary care physician.   Patient wishes to talk to psych today.

## 2020-05-17 VITALS
RESPIRATION RATE: 18 BRPM | DIASTOLIC BLOOD PRESSURE: 95 MMHG | SYSTOLIC BLOOD PRESSURE: 161 MMHG | HEART RATE: 46 BPM | TEMPERATURE: 98 F | OXYGEN SATURATION: 100 %

## 2020-05-17 VITALS — RESPIRATION RATE: 18 BRPM | HEIGHT: 66 IN | TEMPERATURE: 98 F | OXYGEN SATURATION: 99 % | WEIGHT: 137.13 LBS

## 2020-05-17 DIAGNOSIS — F32.1 MAJOR DEPRESSIVE DISORDER, SINGLE EPISODE, MODERATE: ICD-10-CM

## 2020-05-17 LAB
SARS-COV-2 RNA SPEC QL NAA+PROBE: SIGNIFICANT CHANGE UP
T PALLIDUM AB TITR SER: NEGATIVE — SIGNIFICANT CHANGE UP

## 2020-05-17 RX ORDER — HYDRALAZINE HCL 50 MG
25 TABLET ORAL ONCE
Refills: 0 | Status: COMPLETED | OUTPATIENT
Start: 2020-05-17 | End: 2020-05-17

## 2020-05-17 RX ORDER — SERTRALINE 25 MG/1
50 TABLET, FILM COATED ORAL ONCE
Refills: 0 | Status: COMPLETED | OUTPATIENT
Start: 2020-05-17 | End: 2020-05-17

## 2020-05-17 RX ADMIN — SERTRALINE 50 MILLIGRAM(S): 25 TABLET, FILM COATED ORAL at 01:29

## 2020-05-17 RX ADMIN — Medication 25 MILLIGRAM(S): at 06:22

## 2020-05-17 NOTE — ED BEHAVIORAL HEALTH NOTE - BEHAVIORAL HEALTH NOTE
Patient reassessed by Telepsychiatry at 8:55am.  Patient is cooperative, reports still feeling depressed and anxious, does not feel safe outside of the hospital.  Denied any overt physical symptoms, reports received sertraline and hydralazine last night and prophylactic medication for chlamydia/GC yesterday.  He understand that covid result is still pending to determine admitting facility.      A/P  58yo domiciled with roommates, unemployed on food stamps,  AA M with hx of crack cocaine, etoh use with no past psych hx (no hospitalization, no tx, no diagnosis, no SA/SIB) with med hx of CAD (reported to have MIs), HL, HTN, TIAs, prostate CA (s/p prostectomy 12yrs ago), CHF who presented with worsening depression and suicidal thoughts.      Patient spoke of how in the past 3-4 months, he has been experiencing worsening depressive mood with issues of insomnia, appetite loss (with reported wt loss of 30lbs in past 5-6mo) with sense of guilt, isolation, hopelessness, helplessness. Patient reported experiencing thoughts of death, feeling "like [his] mother is calling [him] to join her." This is all in context of being unemployed, at times having no access to food. Patient reported that today considered cutting wrists in his bathtub but his Orthodoxy belief about suicide being a sin prevented him from acting on this. Patient spoke of history "feeling on a high" for a while, "feeling things are going to be great" but acknowledged in  context of crack cocaine use. patient denied hx of FOI, dec need for sleep. Patient reported perceptual issues of seeing shadows.    -continue 1 : 1 for safety  -Patient is for psychiatric admission  -pending covid result for admission destination for psychiatric stabilization  -medical management per ED provider  -can give Ativan 0.5mg q6H PRN for agitation/anxiety.

## 2020-05-17 NOTE — ED BEHAVIORAL HEALTH ASSESSMENT NOTE - HPI (INCLUDE ILLNESS QUALITY, SEVERITY, DURATION, TIMING, CONTEXT, MODIFYING FACTORS, ASSOCIATED SIGNS AND SYMPTOMS)
60yo domiciled with roommates, unemployed on food stamps,  AA M with hx of crack cocaine, etoh use with no past psych hx (no hospitalization, no tx, no diagnosis, no SA/SIB) with med hx of CAD (reported to have MIs), HL, HTN, TIAs, prostate CA (s/p prostectomy 12yrs ago), CHF who presented with worsening depression and suicidal thoughts.      Patient spoke of how in the past 3-4 months, he has been experiencing worsening depressive mood with issues of insomnia, appetite loss (with reported wt loss of 30lbs in past 5-6mo) with sense of guilt, isolation, hopelessness, helplessness. Patient reported experiencing thoughts of death, feeling "like [his] mother is calling [him] to join her." This is all in context of being unemployed, at times having no access to food. Patient reported that today considered cutting wrists in his bathtub but his Yazidi belief about suicide being a sin prevented him from acting on this. Patient spoke of history "feeling on a high" for a while, "feeling things are going to be great" but acknowledged in  context of crack cocaine use. patient denied hx of FOI, dec need for sleep. Patient reported perceptual issues of seeing shadows.

## 2020-05-17 NOTE — ED ADULT NURSE REASSESSMENT NOTE - NS ED NURSE REASSESS COMMENT FT1
Assuming care from previous RN Perla, pt is A&OX3 with loose association of thoughts, pt safety maintained with a 1:1, denies SOB, resp even and unlabored, skin warm and dry, color is normal for race, denies pain/n/v, pt aware of plan of care and proficiency determined by successful teach back demonstration. Pt does not appear to be in any distress at this time.

## 2020-05-17 NOTE — ED BEHAVIORAL HEALTH NOTE - BEHAVIORAL HEALTH NOTE
Patient reassessed by Telepsychiatry at 16:34.  Patient is cooperative, reports still feeling depressed and anxious, does not feel safe outside of the hospital.  Denied any overt physical symptoms, still feels unsafe outside of hospital setting.  He understand that covid result is still pending to determine admitting facility.      A/P  60yo domiciled with roommates, unemployed on food stamps,  AA M with hx of crack cocaine, etoh use with no past psych hx (no hospitalization, no tx, no diagnosis, no SA/SIB) with med hx of CAD (reported to have MIs), HL, HTN, TIAs, prostate CA (s/p prostectomy 12yrs ago), CHF who presented with worsening depression and suicidal thoughts.      Patient spoke of how in the past 3-4 months, he has been experiencing worsening depressive mood with issues of insomnia, appetite loss (with reported wt loss of 30lbs in past 5-6mo) with sense of guilt, isolation, hopelessness, helplessness. Patient reported experiencing thoughts of death, feeling "like [his] mother is calling [him] to join her." This is all in context of being unemployed, at times having no access to food. Patient reported that today considered cutting wrists in his bathtub but his Mosque belief about suicide being a sin prevented him from acting on this. Patient spoke of history "feeling on a high" for a while, "feeling things are going to be great" but acknowledged in  context of crack cocaine use. patient denied hx of FOI, dec need for sleep. Patient reported perceptual issues of seeing shadows.    -continue 1 : 1 for safety  -Patient is for psychiatric admission  -pending covid result for admission destination for psychiatric stabilization  -medical management per ED provider  -continue sertraline 50mg qhs, can give Ativan 0.5mg q6H PRN for agitation/anxiety.

## 2020-05-17 NOTE — ED BEHAVIORAL HEALTH ASSESSMENT NOTE - SUMMARY
60yo domiciled, recently unemployed,  AA M with hx of crack cocaine, etoh use with no prior psych hx who presents with worsening depressive mood with neuroveg symptoms, suicidal ideations with plan but without intent for past 3-4mos in context of job loss, financial strains. Patient's presentation and hx is also notable to crack cocaine use - this might be responsible for his affective symptoms. Patient has risk factors of self harm inc lack of social support, substance use, depressive mood and ongoing suicidal ideation with plan without intent but with protective factor of his hermes. Patient might benefit from inpatient psych hospitalization to help start treatment for his depressive symptoms. Patient is amenable to an inpatient treatment.

## 2020-05-17 NOTE — ED BEHAVIORAL HEALTH ASSESSMENT NOTE - DESCRIPTION
No beh issues CAD, TIA, CHF, HTN, HL, prostate CA s/p prostectomy  x 28yrs, 4 adult children but no contact, unemployed (prev employed as  at [employer])

## 2020-05-17 NOTE — ED ADULT NURSE REASSESSMENT NOTE - NS ED NURSE REASSESS COMMENT FT1
Pt initially refusing zoloft; pt asking questions about medication, who prescribed and why etc. ERMD aware, apparently this had already been discussed with pt and was a recommendation by psych. ERMD back to see pt to reinforce, pt given drug monograph via Care Notes and then was agreeable to med administration. Pt sitting at EOB eating dinner tray with 1:1 maintained

## 2020-05-17 NOTE — ED BEHAVIORAL HEALTH ASSESSMENT NOTE - DESCRIPTION (FIRST USE, LAST USE, QUANTITY, FREQUENCY, DURATION)
unknown amt/freq last use 2 days ago - 3 beers; denied regular use- denied hx of W/D crack cocaine use -most recent 4 mo sobriety with relapse 2 weeks ago (also last use)

## 2020-05-17 NOTE — ED ADULT NURSE REASSESSMENT NOTE - NS ED NURSE REASSESS COMMENT FT1
Pt oob to br, tech notified of BP systollically in 190s. Provider aware, 25mg hydralazine given. Pt with hx of CHF, HTN, has reportedly not been on meds in a few months. Pt remains bradycardic in the 40s, unchanged from previous readings w/o symptomology

## 2020-05-17 NOTE — ED ADULT NURSE REASSESSMENT NOTE - NS ED NURSE REASSESS COMMENT FT1
Pt resting in stretcher, transfer form complete by MEAGAN, witnessed by myself. 1:1 continues, awaiting transfer to [facility] pending bed availability, will sign off..................................BATOOL REYES

## 2020-05-18 ENCOUNTER — INPATIENT (INPATIENT)
Facility: HOSPITAL | Age: 60
LOS: 8 days | Discharge: HOME | End: 2020-05-27
Attending: PSYCHIATRY & NEUROLOGY | Admitting: PSYCHIATRY & NEUROLOGY
Payer: MEDICAID

## 2020-05-18 DIAGNOSIS — Z90.49 ACQUIRED ABSENCE OF OTHER SPECIFIED PARTS OF DIGESTIVE TRACT: Chronic | ICD-10-CM

## 2020-05-18 DIAGNOSIS — F29 UNSPECIFIED PSYCHOSIS NOT DUE TO A SUBSTANCE OR KNOWN PHYSIOLOGICAL CONDITION: ICD-10-CM

## 2020-05-18 LAB
APPEARANCE UR: CLEAR — SIGNIFICANT CHANGE UP
BILIRUB UR-MCNC: NEGATIVE — SIGNIFICANT CHANGE UP
BLOOD UR QL VISUAL: NEGATIVE — SIGNIFICANT CHANGE UP
C TRACH RRNA SPEC QL NAA+PROBE: SIGNIFICANT CHANGE UP
COLOR SPEC: SIGNIFICANT CHANGE UP
CULTURE RESULTS: NO GROWTH — SIGNIFICANT CHANGE UP
GLUCOSE UR-MCNC: NEGATIVE — SIGNIFICANT CHANGE UP
KETONES UR-MCNC: NEGATIVE — SIGNIFICANT CHANGE UP
LEUKOCYTE ESTERASE UR-ACNC: NEGATIVE — SIGNIFICANT CHANGE UP
N GONORRHOEA RRNA SPEC QL NAA+PROBE: SIGNIFICANT CHANGE UP
NITRITE UR-MCNC: NEGATIVE — SIGNIFICANT CHANGE UP
PH UR: 6 — SIGNIFICANT CHANGE UP (ref 5–8)
PROT UR-MCNC: 10 — SIGNIFICANT CHANGE UP
SP GR SPEC: 1.03 — SIGNIFICANT CHANGE UP (ref 1–1.04)
SPECIMEN SOURCE: SIGNIFICANT CHANGE UP
SPECIMEN SOURCE: SIGNIFICANT CHANGE UP
UROBILINOGEN FLD QL: NORMAL — SIGNIFICANT CHANGE UP

## 2020-05-18 PROCEDURE — 93010 ELECTROCARDIOGRAM REPORT: CPT

## 2020-05-18 PROCEDURE — 99223 1ST HOSP IP/OBS HIGH 75: CPT

## 2020-05-18 PROCEDURE — 99222 1ST HOSP IP/OBS MODERATE 55: CPT | Mod: GC

## 2020-05-18 RX ORDER — AMLODIPINE BESYLATE 2.5 MG/1
10 TABLET ORAL DAILY
Refills: 0 | Status: DISCONTINUED | OUTPATIENT
Start: 2020-05-18 | End: 2020-05-27

## 2020-05-18 RX ORDER — HALOPERIDOL DECANOATE 100 MG/ML
5 INJECTION INTRAMUSCULAR EVERY 6 HOURS
Refills: 0 | Status: DISCONTINUED | OUTPATIENT
Start: 2020-05-18 | End: 2020-05-27

## 2020-05-18 RX ORDER — ATORVASTATIN CALCIUM 80 MG/1
20 TABLET, FILM COATED ORAL AT BEDTIME
Refills: 0 | Status: DISCONTINUED | OUTPATIENT
Start: 2020-05-18 | End: 2020-05-27

## 2020-05-18 RX ORDER — ASPIRIN/CALCIUM CARB/MAGNESIUM 324 MG
81 TABLET ORAL DAILY
Refills: 0 | Status: DISCONTINUED | OUTPATIENT
Start: 2020-05-18 | End: 2020-05-27

## 2020-05-18 RX ORDER — ALBUTEROL 90 UG/1
2 AEROSOL, METERED ORAL EVERY 6 HOURS
Refills: 0 | Status: DISCONTINUED | OUTPATIENT
Start: 2020-05-18 | End: 2020-05-27

## 2020-05-18 RX ORDER — MIRTAZAPINE 45 MG/1
7.5 TABLET, ORALLY DISINTEGRATING ORAL AT BEDTIME
Refills: 0 | Status: DISCONTINUED | OUTPATIENT
Start: 2020-05-18 | End: 2020-05-22

## 2020-05-18 RX ORDER — LANOLIN ALCOHOL/MO/W.PET/CERES
3 CREAM (GRAM) TOPICAL AT BEDTIME
Refills: 0 | Status: DISCONTINUED | OUTPATIENT
Start: 2020-05-18 | End: 2020-05-27

## 2020-05-18 RX ORDER — HALOPERIDOL DECANOATE 100 MG/ML
5 INJECTION INTRAMUSCULAR ONCE
Refills: 0 | Status: DISCONTINUED | OUTPATIENT
Start: 2020-05-18 | End: 2020-05-27

## 2020-05-18 RX ORDER — HYDRALAZINE HCL 50 MG
25 TABLET ORAL DAILY
Refills: 0 | Status: DISCONTINUED | OUTPATIENT
Start: 2020-05-18 | End: 2020-05-18

## 2020-05-18 RX ORDER — ESCITALOPRAM OXALATE 10 MG/1
10 TABLET, FILM COATED ORAL DAILY
Refills: 0 | Status: DISCONTINUED | OUTPATIENT
Start: 2020-05-18 | End: 2020-05-27

## 2020-05-18 RX ADMIN — ESCITALOPRAM OXALATE 10 MILLIGRAM(S): 10 TABLET, FILM COATED ORAL at 12:18

## 2020-05-18 RX ADMIN — MIRTAZAPINE 7.5 MILLIGRAM(S): 45 TABLET, ORALLY DISINTEGRATING ORAL at 21:02

## 2020-05-18 RX ADMIN — Medication 25 MILLIGRAM(S): at 09:56

## 2020-05-18 RX ADMIN — ATORVASTATIN CALCIUM 20 MILLIGRAM(S): 80 TABLET, FILM COATED ORAL at 21:02

## 2020-05-18 RX ADMIN — Medication 81 MILLIGRAM(S): at 09:56

## 2020-05-18 RX ADMIN — AMLODIPINE BESYLATE 10 MILLIGRAM(S): 2.5 TABLET ORAL at 13:21

## 2020-05-18 NOTE — CONSULT NOTE ADULT - ASSESSMENT
59 y.o. M with h/o asthma, HTN, CAD, CHF, HLD, cocaine abuse, CKD 3, off meds X 4 months, admitted for depression.     1. HTN: Poorly controlled, asymptomatic.  -Start Amlodipine 10mg daily  -Consider add Hydralazine if BP poorly controlled on Amlodipine   -Monitor BP    2. CHF: Euvolemic, pt was taken off diuretic by his PCP  -Will try to obtain old record, echo report from [facility].  -Monitor for signs of decompensated CHF    3. CAD: asymptomatic.  -Obtain more information from [facility]   -ASA 81 mg daily.  -Statin  -Dash diet     4. HLD:  -Atorvastatin 20mg daily  -Check Fasting lipid profile    5. CKD 3  -Monitor BUN/creat  -Avoid nephrotoxic agents    6. Asthma: asymptomatic  -Cont Albuterol MDI PRN    7. Tobacco and Cocaine abuse:   -Counseling for smoking cessation and cocaine abuse provided to pt. Pt declined Nicotin patch to assit.     8. Depression:  -Management as per Psych

## 2020-05-18 NOTE — CONSULT NOTE ADULT - SUBJECTIVE AND OBJECTIVE BOX
Pt seen and examined with a RN    HPI: 59 y.o. M with h/o asthma, HTN, CAD, CHF, unknown  EF, HLD, cocaine abuse, smoker, admitted to [facility] for depression. Pt has not been on any medication X 4 months due to his moving out of [place]. As per pt he was taken off water pills, and was on ASA, Statin, Amlodipine, albuterol inhaler. Pt was recently put on Prednisone 20mg daily X 5days for his asthma attack. Currently, pt has no complaints, denies any CP, SOB, ambulating well.     PAST MEDICAL & SURGICAL HISTORY:  Asthma  Substance abuse  CAD (coronary artery disease)  CHF (congestive heart failure)  Hypertension  Myocardial infarction, unspecified MI type, unspecified artery  TIA (transient ischemic attack)  Hyperlipidemia  Carpal tunnel syndrome: R hand  Hypertension  History of appendectomy      Review of Systems:   CONSTITUTIONAL: No fever, weight loss, or fatigue  EYES: No eye pain, visual disturbances, or discharge  ENMT:  No difficulty hearing, tinnitus, vertigo; No sinus or throat pain  NECK: No pain or stiffness  RESPIRATORY: No cough, wheezing, chills or hemoptysis; No shortness of breath  CARDIOVASCULAR: No chest pain, palpitations, dizziness, or leg swelling  GASTROINTESTINAL: No abdominal or epigastric pain. No nausea, vomiting, or hematemesis; No diarrhea or constipation. No melena or hematochezia.  GENITOURINARY: No dysuria, frequency, hematuria, or incontinence  NEUROLOGICAL: No headaches, memory loss, loss of strength, numbness, or tremors  SKIN: No itching, burning, rashes, or lesions   LYMPH NODES: No enlarged glands  ENDOCRINE: No heat or cold intolerance; No hair loss  MUSCULOSKELETAL: No joint pain or swelling; No muscle, back, or extremity pain  HEME/LYMPH: No easy bruising, or bleeding gums  ALLERY AND IMMUNOLOGIC: No hives or eczema    Allergies    No Known Allergies    Intolerances        Social History: , (+) Cocaine abuse last use 4 days ago, (+) Tobacco: 5-6 cigarets daily. (+) social alcohol abuse      FAMILY HISTORY: family h/o DM       MEDICATIONS  (STANDING):  aspirin enteric coated 81 milliGRAM(s) Oral daily  atorvastatin 20 milliGRAM(s) Oral at bedtime  escitalopram 10 milliGRAM(s) Oral daily  hydrALAZINE 25 milliGRAM(s) Oral daily  mirtazapine 7.5 milliGRAM(s) Oral at bedtime    MEDICATIONS  (PRN):  ALBUTerol    90 MICROgram(s) HFA Inhaler 2 Puff(s) Inhalation every 6 hours PRN SOB  haloperidol     Tablet 5 milliGRAM(s) Oral every 6 hours PRN severe agitation  haloperidol    Injectable 5 milliGRAM(s) IntraMuscular once PRN severe agitation  LORazepam     Tablet 2 milliGRAM(s) Oral every 6 hours PRN agitation/anxiety  LORazepam   Injectable 2 milliGRAM(s) IntraMuscular once PRN severe agitation  melatonin. 3 milliGRAM(s) Oral at bedtime PRN Insomnia      Vital Signs Last 24 Hrs  T(C): 36.6 (18 May 2020 08:19), Max: 36.9 (17 May 2020 16:14)  T(F): 97.9 (18 May 2020 08:19), Max: 98.5 (17 May 2020 16:14)  HR: 48 (17 May 2020 23:25) (40 - 49)  BP: 160/98 (17 May 2020 23:25) (145/90 - 161/95)  BP(mean): --  RR: 18 (17 May 2020 23:10) (16 - 18)  SpO2: 99% (17 May 2020 23:10) (97% - 100%)  CAPILLARY BLOOD GLUCOSE            PHYSICAL EXAM:  GENERAL: NAD, well-developed  HEAD:  Atraumatic, Normocephalic  EYES: EOMI, conjunctiva and sclera clear  NECK: Supple, No JVD  CHEST/LUNG: Clear to auscultation bilaterally; No wheeze or crackles   HEART: Regular salvatore at 50's; No murmurs, rubs, or gallops  ABDOMEN: Soft, Nontender, Nondistended; Bowel sounds present  EXTREMITIES:  2+ Peripheral Pulses, No clubbing, cyanosis, or edema  NEUROLOGY: non-focal  SKIN: No rashes or lesions    LABS:                        13.0   5.59  )-----------( 246      ( 16 May 2020 20:15 )             40.8     05-16    140  |  107  |  21  ----------------------------<  144<H>  4.0   |  24  |  1.78<H>    Ca    8.4<L>      16 May 2020 20:15    TPro  6.9  /  Alb  3.0<L>  /  TBili  0.3  /  DBili  x   /  AST  18  /  ALT  18  /  AlkPhos  62  05-16          Urinalysis Basic - ( 18 May 2020 08:40 )    Color: LIGHT YELLOW / Appearance: CLEAR / S.028 / pH: 6.0  Gluc: NEGATIVE / Ketone: NEGATIVE  / Bili: NEGATIVE / Urobili: NORMAL   Blood: NEGATIVE / Protein: 10 / Nitrite: NEGATIVE   Leuk Esterase: NEGATIVE / RBC: x / WBC x   Sq Epi: x / Non Sq Epi: x / Bacteria: x        EKG(personally reviewed): Sinus salvatore, no significant changes from 2019     RADIOLOGY & ADDITIONAL TESTS:    Imaging Personally Reviewed:    Consultant(s) Notes Reviewed:      Care Discussed with Consultants/Other Providers:

## 2020-05-19 DIAGNOSIS — R45.851 SUICIDAL IDEATIONS: ICD-10-CM

## 2020-05-19 DIAGNOSIS — J45.909 UNSPECIFIED ASTHMA, UNCOMPLICATED: ICD-10-CM

## 2020-05-19 DIAGNOSIS — R53.1 WEAKNESS: ICD-10-CM

## 2020-05-19 DIAGNOSIS — Z90.49 ACQUIRED ABSENCE OF OTHER SPECIFIED PARTS OF DIGESTIVE TRACT: ICD-10-CM

## 2020-05-19 DIAGNOSIS — I25.2 OLD MYOCARDIAL INFARCTION: ICD-10-CM

## 2020-05-19 DIAGNOSIS — E78.5 HYPERLIPIDEMIA, UNSPECIFIED: ICD-10-CM

## 2020-05-19 DIAGNOSIS — Z86.73 PERSONAL HISTORY OF TRANSIENT ISCHEMIC ATTACK (TIA), AND CEREBRAL INFARCTION WITHOUT RESIDUAL DEFICITS: ICD-10-CM

## 2020-05-19 DIAGNOSIS — I10 ESSENTIAL (PRIMARY) HYPERTENSION: ICD-10-CM

## 2020-05-19 DIAGNOSIS — F32.9 MAJOR DEPRESSIVE DISORDER, SINGLE EPISODE, UNSPECIFIED: ICD-10-CM

## 2020-05-19 DIAGNOSIS — G56.01 CARPAL TUNNEL SYNDROME, RIGHT UPPER LIMB: ICD-10-CM

## 2020-05-19 DIAGNOSIS — I25.10 ATHEROSCLEROTIC HEART DISEASE OF NATIVE CORONARY ARTERY WITHOUT ANGINA PECTORIS: ICD-10-CM

## 2020-05-19 LAB
ALBUMIN SERPL ELPH-MCNC: 3.8 G/DL — SIGNIFICANT CHANGE UP (ref 3.3–5)
ALBUMIN SERPL ELPH-MCNC: 3.8 G/DL — SIGNIFICANT CHANGE UP (ref 3.3–5)
ALP SERPL-CCNC: 54 U/L — SIGNIFICANT CHANGE UP (ref 40–120)
ALP SERPL-CCNC: 54 U/L — SIGNIFICANT CHANGE UP (ref 40–120)
ALT FLD-CCNC: 7 U/L — SIGNIFICANT CHANGE UP (ref 4–41)
ALT FLD-CCNC: 7 U/L — SIGNIFICANT CHANGE UP (ref 4–41)
ANION GAP SERPL CALC-SCNC: 9 MMO/L — SIGNIFICANT CHANGE UP (ref 7–14)
ANION GAP SERPL CALC-SCNC: 9 MMO/L — SIGNIFICANT CHANGE UP (ref 7–14)
AST SERPL-CCNC: 14 U/L — SIGNIFICANT CHANGE UP (ref 4–40)
AST SERPL-CCNC: 14 U/L — SIGNIFICANT CHANGE UP (ref 4–40)
BILIRUB SERPL-MCNC: < 0.2 MG/DL — LOW (ref 0.2–1.2)
BILIRUB SERPL-MCNC: < 0.2 MG/DL — LOW (ref 0.2–1.2)
BUN SERPL-MCNC: 21 MG/DL — SIGNIFICANT CHANGE UP (ref 7–23)
BUN SERPL-MCNC: 21 MG/DL — SIGNIFICANT CHANGE UP (ref 7–23)
CALCIUM SERPL-MCNC: 9.3 MG/DL — SIGNIFICANT CHANGE UP (ref 8.4–10.5)
CALCIUM SERPL-MCNC: 9.3 MG/DL — SIGNIFICANT CHANGE UP (ref 8.4–10.5)
CHLORIDE SERPL-SCNC: 107 MMOL/L — SIGNIFICANT CHANGE UP (ref 98–107)
CHLORIDE SERPL-SCNC: 107 MMOL/L — SIGNIFICANT CHANGE UP (ref 98–107)
CHOLEST SERPL-MCNC: 160 MG/DL — SIGNIFICANT CHANGE UP (ref 120–199)
CO2 SERPL-SCNC: 27 MMOL/L — SIGNIFICANT CHANGE UP (ref 22–31)
CO2 SERPL-SCNC: 27 MMOL/L — SIGNIFICANT CHANGE UP (ref 22–31)
CREAT SERPL-MCNC: 1.63 MG/DL — HIGH (ref 0.5–1.3)
CREAT SERPL-MCNC: 1.63 MG/DL — HIGH (ref 0.5–1.3)
FOLATE SERPL-MCNC: 7.5 NG/ML — SIGNIFICANT CHANGE UP (ref 4.7–20)
GLUCOSE SERPL-MCNC: 61 MG/DL — LOW (ref 70–99)
GLUCOSE SERPL-MCNC: 61 MG/DL — LOW (ref 70–99)
HBA1C BLD-MCNC: 5.5 % — SIGNIFICANT CHANGE UP (ref 4–5.6)
HDLC SERPL-MCNC: 79 MG/DL — HIGH (ref 35–55)
LIPID PNL WITH DIRECT LDL SERPL: 81 MG/DL — SIGNIFICANT CHANGE UP
MAGNESIUM SERPL-MCNC: 2 MG/DL — SIGNIFICANT CHANGE UP (ref 1.6–2.6)
PHOSPHATE SERPL-MCNC: 3.4 MG/DL — SIGNIFICANT CHANGE UP (ref 2.5–4.5)
POTASSIUM SERPL-MCNC: 4.9 MMOL/L — SIGNIFICANT CHANGE UP (ref 3.5–5.3)
POTASSIUM SERPL-MCNC: 4.9 MMOL/L — SIGNIFICANT CHANGE UP (ref 3.5–5.3)
POTASSIUM SERPL-SCNC: 4.9 MMOL/L — SIGNIFICANT CHANGE UP (ref 3.5–5.3)
POTASSIUM SERPL-SCNC: 4.9 MMOL/L — SIGNIFICANT CHANGE UP (ref 3.5–5.3)
PROT SERPL-MCNC: 7.1 G/DL — SIGNIFICANT CHANGE UP (ref 6–8.3)
PROT SERPL-MCNC: 7.1 G/DL — SIGNIFICANT CHANGE UP (ref 6–8.3)
SODIUM SERPL-SCNC: 143 MMOL/L — SIGNIFICANT CHANGE UP (ref 135–145)
SODIUM SERPL-SCNC: 143 MMOL/L — SIGNIFICANT CHANGE UP (ref 135–145)
TRIGL SERPL-MCNC: 64 MG/DL — SIGNIFICANT CHANGE UP (ref 10–149)
VIT B12 SERPL-MCNC: 310 PG/ML — SIGNIFICANT CHANGE UP (ref 200–900)

## 2020-05-19 PROCEDURE — 99231 SBSQ HOSP IP/OBS SF/LOW 25: CPT

## 2020-05-19 PROCEDURE — 90832 PSYTX W PT 30 MINUTES: CPT

## 2020-05-19 RX ORDER — ACETAMINOPHEN 500 MG
650 TABLET ORAL EVERY 6 HOURS
Refills: 0 | Status: DISCONTINUED | OUTPATIENT
Start: 2020-05-19 | End: 2020-05-27

## 2020-05-19 RX ADMIN — MIRTAZAPINE 7.5 MILLIGRAM(S): 45 TABLET, ORALLY DISINTEGRATING ORAL at 21:31

## 2020-05-19 RX ADMIN — ESCITALOPRAM OXALATE 10 MILLIGRAM(S): 10 TABLET, FILM COATED ORAL at 08:33

## 2020-05-19 RX ADMIN — Medication 81 MILLIGRAM(S): at 08:33

## 2020-05-19 RX ADMIN — ATORVASTATIN CALCIUM 20 MILLIGRAM(S): 80 TABLET, FILM COATED ORAL at 21:31

## 2020-05-19 RX ADMIN — AMLODIPINE BESYLATE 10 MILLIGRAM(S): 2.5 TABLET ORAL at 08:33

## 2020-05-20 PROCEDURE — 99231 SBSQ HOSP IP/OBS SF/LOW 25: CPT

## 2020-05-20 RX ADMIN — MIRTAZAPINE 7.5 MILLIGRAM(S): 45 TABLET, ORALLY DISINTEGRATING ORAL at 20:38

## 2020-05-20 RX ADMIN — Medication 81 MILLIGRAM(S): at 09:07

## 2020-05-20 RX ADMIN — ESCITALOPRAM OXALATE 10 MILLIGRAM(S): 10 TABLET, FILM COATED ORAL at 09:07

## 2020-05-20 RX ADMIN — AMLODIPINE BESYLATE 10 MILLIGRAM(S): 2.5 TABLET ORAL at 09:06

## 2020-05-20 RX ADMIN — ATORVASTATIN CALCIUM 20 MILLIGRAM(S): 80 TABLET, FILM COATED ORAL at 20:38

## 2020-05-21 PROCEDURE — 99231 SBSQ HOSP IP/OBS SF/LOW 25: CPT | Mod: GC

## 2020-05-21 RX ADMIN — Medication 81 MILLIGRAM(S): at 08:54

## 2020-05-21 RX ADMIN — MIRTAZAPINE 7.5 MILLIGRAM(S): 45 TABLET, ORALLY DISINTEGRATING ORAL at 20:24

## 2020-05-21 RX ADMIN — ESCITALOPRAM OXALATE 10 MILLIGRAM(S): 10 TABLET, FILM COATED ORAL at 08:54

## 2020-05-21 RX ADMIN — ATORVASTATIN CALCIUM 20 MILLIGRAM(S): 80 TABLET, FILM COATED ORAL at 20:24

## 2020-05-21 RX ADMIN — AMLODIPINE BESYLATE 10 MILLIGRAM(S): 2.5 TABLET ORAL at 08:54

## 2020-05-22 PROCEDURE — 99231 SBSQ HOSP IP/OBS SF/LOW 25: CPT | Mod: GC

## 2020-05-22 RX ORDER — MIRTAZAPINE 45 MG/1
7.5 TABLET, ORALLY DISINTEGRATING ORAL
Refills: 0 | Status: DISCONTINUED | OUTPATIENT
Start: 2020-05-22 | End: 2020-05-27

## 2020-05-22 RX ADMIN — AMLODIPINE BESYLATE 10 MILLIGRAM(S): 2.5 TABLET ORAL at 09:26

## 2020-05-22 RX ADMIN — Medication 81 MILLIGRAM(S): at 09:26

## 2020-05-22 RX ADMIN — MIRTAZAPINE 7.5 MILLIGRAM(S): 45 TABLET, ORALLY DISINTEGRATING ORAL at 23:51

## 2020-05-22 RX ADMIN — ESCITALOPRAM OXALATE 10 MILLIGRAM(S): 10 TABLET, FILM COATED ORAL at 09:27

## 2020-05-22 RX ADMIN — ATORVASTATIN CALCIUM 20 MILLIGRAM(S): 80 TABLET, FILM COATED ORAL at 23:51

## 2020-05-23 RX ADMIN — MIRTAZAPINE 7.5 MILLIGRAM(S): 45 TABLET, ORALLY DISINTEGRATING ORAL at 21:40

## 2020-05-23 RX ADMIN — AMLODIPINE BESYLATE 10 MILLIGRAM(S): 2.5 TABLET ORAL at 10:15

## 2020-05-23 RX ADMIN — ESCITALOPRAM OXALATE 10 MILLIGRAM(S): 10 TABLET, FILM COATED ORAL at 10:15

## 2020-05-23 RX ADMIN — ATORVASTATIN CALCIUM 20 MILLIGRAM(S): 80 TABLET, FILM COATED ORAL at 21:40

## 2020-05-23 RX ADMIN — Medication 81 MILLIGRAM(S): at 10:15

## 2020-05-24 RX ADMIN — AMLODIPINE BESYLATE 10 MILLIGRAM(S): 2.5 TABLET ORAL at 09:36

## 2020-05-24 RX ADMIN — Medication 81 MILLIGRAM(S): at 09:36

## 2020-05-24 RX ADMIN — ATORVASTATIN CALCIUM 20 MILLIGRAM(S): 80 TABLET, FILM COATED ORAL at 22:44

## 2020-05-24 RX ADMIN — MIRTAZAPINE 7.5 MILLIGRAM(S): 45 TABLET, ORALLY DISINTEGRATING ORAL at 22:44

## 2020-05-24 RX ADMIN — ESCITALOPRAM OXALATE 10 MILLIGRAM(S): 10 TABLET, FILM COATED ORAL at 09:36

## 2020-05-25 RX ADMIN — AMLODIPINE BESYLATE 10 MILLIGRAM(S): 2.5 TABLET ORAL at 09:04

## 2020-05-25 RX ADMIN — ESCITALOPRAM OXALATE 10 MILLIGRAM(S): 10 TABLET, FILM COATED ORAL at 09:04

## 2020-05-25 RX ADMIN — MIRTAZAPINE 7.5 MILLIGRAM(S): 45 TABLET, ORALLY DISINTEGRATING ORAL at 22:11

## 2020-05-25 RX ADMIN — ATORVASTATIN CALCIUM 20 MILLIGRAM(S): 80 TABLET, FILM COATED ORAL at 22:11

## 2020-05-25 RX ADMIN — Medication 81 MILLIGRAM(S): at 09:04

## 2020-05-26 PROCEDURE — 99231 SBSQ HOSP IP/OBS SF/LOW 25: CPT | Mod: GC

## 2020-05-26 PROCEDURE — 90832 PSYTX W PT 30 MINUTES: CPT

## 2020-05-26 RX ORDER — AMLODIPINE BESYLATE 2.5 MG/1
1 TABLET ORAL
Qty: 14 | Refills: 0
Start: 2020-05-26 | End: 2020-06-08

## 2020-05-26 RX ORDER — ESCITALOPRAM OXALATE 10 MG/1
1 TABLET, FILM COATED ORAL
Qty: 14 | Refills: 0
Start: 2020-05-26 | End: 2020-06-08

## 2020-05-26 RX ORDER — ATORVASTATIN CALCIUM 80 MG/1
1 TABLET, FILM COATED ORAL
Qty: 28 | Refills: 0
Start: 2020-05-26 | End: 2020-06-22

## 2020-05-26 RX ORDER — MIRTAZAPINE 45 MG/1
1 TABLET, ORALLY DISINTEGRATING ORAL
Qty: 14 | Refills: 0
Start: 2020-05-26 | End: 2020-06-08

## 2020-05-26 RX ORDER — ASPIRIN/CALCIUM CARB/MAGNESIUM 324 MG
1 TABLET ORAL
Qty: 28 | Refills: 0
Start: 2020-05-26 | End: 2020-06-22

## 2020-05-26 RX ORDER — ATORVASTATIN CALCIUM 80 MG/1
1 TABLET, FILM COATED ORAL
Qty: 14 | Refills: 0
Start: 2020-05-26 | End: 2020-06-08

## 2020-05-26 RX ORDER — ASPIRIN/CALCIUM CARB/MAGNESIUM 324 MG
1 TABLET ORAL
Qty: 14 | Refills: 0
Start: 2020-05-26 | End: 2020-06-08

## 2020-05-26 RX ADMIN — ATORVASTATIN CALCIUM 20 MILLIGRAM(S): 80 TABLET, FILM COATED ORAL at 22:11

## 2020-05-26 RX ADMIN — Medication 3 MILLIGRAM(S): at 22:11

## 2020-05-26 RX ADMIN — Medication 81 MILLIGRAM(S): at 09:01

## 2020-05-26 RX ADMIN — ESCITALOPRAM OXALATE 10 MILLIGRAM(S): 10 TABLET, FILM COATED ORAL at 09:01

## 2020-05-26 RX ADMIN — AMLODIPINE BESYLATE 10 MILLIGRAM(S): 2.5 TABLET ORAL at 09:01

## 2020-05-26 RX ADMIN — MIRTAZAPINE 7.5 MILLIGRAM(S): 45 TABLET, ORALLY DISINTEGRATING ORAL at 22:11

## 2020-05-27 VITALS — TEMPERATURE: 98 F

## 2020-05-27 PROCEDURE — 99239 HOSP IP/OBS DSCHRG MGMT >30: CPT | Mod: GC

## 2020-05-27 RX ADMIN — ESCITALOPRAM OXALATE 10 MILLIGRAM(S): 10 TABLET, FILM COATED ORAL at 08:32

## 2020-05-27 RX ADMIN — AMLODIPINE BESYLATE 10 MILLIGRAM(S): 2.5 TABLET ORAL at 08:31

## 2020-05-27 RX ADMIN — Medication 81 MILLIGRAM(S): at 08:32

## 2021-11-30 NOTE — ED PROVIDER NOTE - TEMPLATE, MLM
Pt called to get answer to health mainteance issues (Vaccines)    I offer pt first available apt with provide, which is currently 1/4/21    Pt declined and only wants it answered by the nurse.     Please advise Abdominal Pain, N/V/D

## 2024-01-12 NOTE — ED ADULT NURSE NOTE - NSSEPSISSUSPECTED_ED_A_ED
January 12, 2024       Lorenzo Teran MD  [address] [place] [address]  Via In Basket      Patient: Marcial Goldberg   YOB: 1951   Date of Visit: 1/12/2024       Dear Dr. Teran:    Thank you for referring Marcial Goldberg to me for evaluation. Below are my notes for this visit with him.    If you have questions, please do not hesitate to call me. I look forward to following your patient along with you.      Sincerely,        Aleksander López MD        CC: No Recipients  Aleksander López MD  1/12/2024 10:41 AM  Signed  Cardiology Clinic Note: Aleksander López MD    Date of Service: 1/12/2024      HPI:   Marcial Goldberg is a 72 year old man who is here for cardiac follow-up.  Since last visit he has no chest pain, palpitations, shortness of breath, presyncope, or syncope.  He is complaining of frequent urination and is under the care of a urologist and is being treated for BPH.  He remains as active as he can and reports no major functional limitations otherwise.    Cardiac and Relevant Medical History  Specialty Problems          Cardiology Problems    Essential hypertension        Hemorrhoids        Other hyperlipidemia           Review of Systems   Review of Systems   Constitutional: Negative.    HENT: Negative.     Eyes: Negative.    Respiratory: Negative.     Cardiovascular: Negative.    Gastrointestinal: Negative.    Endocrine: Negative.    Genitourinary: Negative.    Musculoskeletal: Negative.    Skin: Negative.    Allergic/Immunologic: Negative.    Neurological: Negative.    Hematological: Negative.    Psychiatric/Behavioral: Negative.         Past Medical History:   Diagnosis Date   • Abdominal pain    • Abnormal ultrasound    • At risk for cardiac complication    • Essential (primary) hypertension    • Gastroesophageal reflux disease    • High cholesterol    • Pulmonary nodule    • Sleep apnea    • Snoring        Past Surgical History:   Procedure Laterality Date   • Knee surgery Right         Family History   Problem Relation Age of Onset   • Coronary Artery Disease Mother    • Hypertension Mother    • Coronary Artery Disease Father    • Stroke Father 81   • Cancer Father    • Congestive Heart Failure Brother         Underwent open heart surgery, before death. Younger brother also underwent open heart surgery.    • Stroke Brother 58   • Motor Vehicle Accident Brother    • Stroke Brother 64       Social History     Tobacco Use   • Smoking status: Never   • Smokeless tobacco: Never   Vaping Use   • Vaping Use: never used   Substance Use Topics   • Alcohol use: Yes     Alcohol/week: 12.0 standard drinks of alcohol     Types: 12 Cans of beer per week     Comment: social 3x/wk   • Drug use: No       ALLERGIES:  No Known Allergies    Current Outpatient Medications   Medication Sig Dispense Refill   • amLODIPine (NORVASC) 10 MG tablet Take 1 tablet by mouth daily. 90 tablet 3   • omeprazole (PrilOSEC) 20 MG capsule Take 1 capsule by mouth daily. 90 capsule 1   • chlorthalidone (THALITONE) 25 MG tablet Take 1 tablet by mouth daily. 90 tablet 3   • atorvastatin (LIPITOR) 10 MG tablet Take 1 tablet by mouth daily. 90 tablet 3   • carvedilol (COREG) 6.25 MG tablet Take 1 tablet by mouth in the morning and 1 tablet in the evening. 180 tablet 3   • losartan (COZAAR) 100 MG tablet TAKE 1 TABLET EVERY DAY 90 tablet 3   • solifenacin (VESICARE) 10 MG tablet Take 10 mg by mouth daily.       No current facility-administered medications for this visit.         Objective:    Physical Exam:   Visit Vitals  /74 (BP Location: LUE - Left upper extremity, Patient Position: Sitting, Cuff Size: Regular) Comment (Cuff Size): long   Pulse (!) 50   Ht 5' 6\" (1.676 m)   Wt 94.4 kg (208 lb 1.8 oz)   SpO2 99%   BMI 33.59 kg/m²         Wt Readings from Last 4 Encounters:   01/12/24 94.4 kg (208 lb 1.8 oz)   10/30/23 98.7 kg (217 lb 9.5 oz)   08/28/23 99.4 kg (219 lb 2.2 oz)   06/06/23 101.4 kg (223 lb 8.7 oz)         Physical  Exam   Constitutional: He appears healthy. No distress.   Vital signs reviewed   HENT:   Mouth/Throat: Oropharynx is clear.   Eyes: Conjunctivae are normal.   Neck: Thyroid normal.   Cardiovascular: Normal rate, regular rhythm, S1 normal, S2 normal, normal heart sounds, intact distal pulses and normal pulses.   Pulmonary/Chest: Effort normal and breath sounds normal. He exhibits no tenderness.   Abdominal: Soft. Bowel sounds are normal.   Musculoskeletal:         General: Normal range of motion.      Cervical back: Normal range of motion and neck supple.   Neurological: He is alert and oriented to person, place, and time. He has normal motor skills. Gait normal.   Skin: Skin is warm and dry. No rash noted. No cyanosis. Nails show no clubbing.         Labs:  Recent Labs   Lab 06/06/23  0811 02/18/23  1033   Cholesterol 119 119   HDL 66 55   Triglycerides 51 46   CALCLDL 43 55   Non-HDL Cholesterol 53 64       Recent Labs   Lab 10/30/23  1011 06/06/23  0811 02/18/23  1033   Sodium 135 133* 135   Chloride 101 96* 100   BUN 13 12 13   Potassium 4.3 4.3 3.6   Glucose 144* 132* 117*   Creatinine 0.74 0.84 0.99   Calcium 9.7 9.8 9.7   TSH  --  2.416  --        Recent Labs   Lab 06/06/23  0811 02/22/23  1525   WBC 7.8 8.0   RBC 5.04 5.21   HGB 14.3 15.2   HCT 43.3 44.8   MCV 85.9 86.0   MCHC 33.0 33.9   RDW-CV 13.5 12.9    231   Lymphocytes, Percent 18 30       Recent Labs   Lab 10/30/23  1011 06/06/23  0811 02/18/23  1033   GPT 19 24 19        No results for input(s): \"NTPROB\" in the last 8765 hours.    The ASCVD Risk score (Juan J DK, et al., 2019) failed to calculate for the following reasons:    The valid total cholesterol range is 130 to 320 mg/dL  Imaging:  No results found.  No results found for this or any previous visit.    No results found for this or any previous visit.    No results found for this or any previous visit.    No results found for this or any previous visit.        Assessment    Essential  hypertension  Blood pressure is reasonably well controlled today. I will continue with his current medication regimen which include amlodipine 10 mg, carvedilol 6.25 mg twice daily.  The urologist was asking him to ask me if the chlorthalidone can be discontinued for the sake of reducing the frequent urination.  I feel this is a reasonable request and we can try it.  He will then need to monitor his blood pressure closely and if the blood pressure rises as a result, we will need to adjust the medications and perhaps select an alternate nondiuretic antihypertensive.  EKG in the office today shows sinus bradycardia with no ischemic ST or T wave changes.     Hyperlipidemia   7/2022   HDL 57 LDL 54  6/6/2023  HDL of 66 TG 51 LDL 43  Lipids at goal on atorvastatin 10 mg QD, follow lipid panel annually.       Bruit  3/22/2021 carotid duplex demonstrating less than 50% stenosis of the ICA bilaterally.       DMII  -Last A1c 6.3% on 10/30/2023 and was 6.0% previously.  -Diet controlled - counseled on diet modifications   -On statin and ARB     Obstructive sleep apnea   · Uses CPAP.  Follow-up with PCP.     Obesity  · Mildly overweight. Needs to focus on diet, exercise, and weight control.  Very nice to see that he lost a significant amount of weight since the last visit.       Orders Placed This Encounter   • Lipid Panel With Reflex   • Comprehensive Metabolic Panel       Recommendations  1.  I will continue to see him annually.  He will see APN in 4 months for follow-up blood pressure check.  If the blood pressure remained stable after discontinuing the chlorthalidone then we can maintain his current regimen.  Otherwise we will need to make adjustments.    Greater than 50% of the time spent was counseling the patient on the above problems.  Total time spent was 35 minutes.      Aleksander López MD   No

## 2024-07-11 ENCOUNTER — INPATIENT (INPATIENT)
Facility: HOSPITAL | Age: 64
LOS: 0 days | Discharge: HOME | End: 2024-07-12
Attending: STUDENT IN AN ORGANIZED HEALTH CARE EDUCATION/TRAINING PROGRAM | Admitting: INTERNAL MEDICINE
Payer: MEDICAID

## 2024-07-11 VITALS
TEMPERATURE: 98 F | DIASTOLIC BLOOD PRESSURE: 101 MMHG | OXYGEN SATURATION: 99 % | WEIGHT: 145.06 LBS | HEART RATE: 50 BPM | HEIGHT: 66 IN | RESPIRATION RATE: 18 BRPM | SYSTOLIC BLOOD PRESSURE: 226 MMHG

## 2024-07-11 DIAGNOSIS — E78.5 HYPERLIPIDEMIA, UNSPECIFIED: ICD-10-CM

## 2024-07-11 DIAGNOSIS — F19.10 OTHER PSYCHOACTIVE SUBSTANCE ABUSE, UNCOMPLICATED: ICD-10-CM

## 2024-07-11 DIAGNOSIS — Z90.49 ACQUIRED ABSENCE OF OTHER SPECIFIED PARTS OF DIGESTIVE TRACT: Chronic | ICD-10-CM

## 2024-07-11 DIAGNOSIS — N18.30 CHRONIC KIDNEY DISEASE, STAGE 3 UNSPECIFIED: ICD-10-CM

## 2024-07-11 DIAGNOSIS — I25.10 ATHEROSCLEROTIC HEART DISEASE OF NATIVE CORONARY ARTERY WITHOUT ANGINA PECTORIS: ICD-10-CM

## 2024-07-11 DIAGNOSIS — I16.0 HYPERTENSIVE URGENCY: ICD-10-CM

## 2024-07-11 DIAGNOSIS — Z86.73 PERSONAL HISTORY OF TRANSIENT ISCHEMIC ATTACK (TIA), AND CEREBRAL INFARCTION WITHOUT RESIDUAL DEFICITS: ICD-10-CM

## 2024-07-11 DIAGNOSIS — N17.9 ACUTE KIDNEY FAILURE, UNSPECIFIED: ICD-10-CM

## 2024-07-11 LAB
ADD ON TEST-SPECIMEN IN LAB: SIGNIFICANT CHANGE UP
ADD ON TEST-SPECIMEN IN LAB: SIGNIFICANT CHANGE UP
AMPHET UR-MCNC: NEGATIVE — SIGNIFICANT CHANGE UP
ANION GAP SERPL CALC-SCNC: 9 MMOL/L — SIGNIFICANT CHANGE UP (ref 5–17)
APPEARANCE UR: CLEAR — SIGNIFICANT CHANGE UP
BARBITURATES UR SCN-MCNC: NEGATIVE — SIGNIFICANT CHANGE UP
BASOPHILS # BLD AUTO: 0.02 K/UL — SIGNIFICANT CHANGE UP (ref 0–0.2)
BASOPHILS NFR BLD AUTO: 0.3 % — SIGNIFICANT CHANGE UP (ref 0–2)
BENZODIAZ UR-MCNC: NEGATIVE — SIGNIFICANT CHANGE UP
BILIRUB UR-MCNC: NEGATIVE — SIGNIFICANT CHANGE UP
BUN SERPL-MCNC: 42 MG/DL — HIGH (ref 7–23)
CALCIUM SERPL-MCNC: 9.4 MG/DL — SIGNIFICANT CHANGE UP (ref 8.4–10.5)
CHLORIDE SERPL-SCNC: 103 MMOL/L — SIGNIFICANT CHANGE UP (ref 96–108)
CK SERPL-CCNC: 264 U/L — HIGH (ref 30–200)
CO2 SERPL-SCNC: 27 MMOL/L — SIGNIFICANT CHANGE UP (ref 22–31)
COCAINE METAB.OTHER UR-MCNC: POSITIVE
COLOR SPEC: YELLOW — SIGNIFICANT CHANGE UP
CREAT SERPL-MCNC: 2.74 MG/DL — HIGH (ref 0.5–1.3)
DIFF PNL FLD: NEGATIVE — SIGNIFICANT CHANGE UP
EGFR: 25 ML/MIN/1.73M2 — LOW
EOSINOPHIL # BLD AUTO: 0.19 K/UL — SIGNIFICANT CHANGE UP (ref 0–0.5)
EOSINOPHIL NFR BLD AUTO: 2.9 % — SIGNIFICANT CHANGE UP (ref 0–6)
FENTANYL UR QL SCN: NEGATIVE — SIGNIFICANT CHANGE UP
GLUCOSE SERPL-MCNC: 139 MG/DL — HIGH (ref 70–99)
GLUCOSE UR QL: NEGATIVE MG/DL — SIGNIFICANT CHANGE UP
HCT VFR BLD CALC: 35.9 % — LOW (ref 39–50)
HGB BLD-MCNC: 11.6 G/DL — LOW (ref 13–17)
IMM GRANULOCYTES NFR BLD AUTO: 0.2 % — SIGNIFICANT CHANGE UP (ref 0–0.9)
KETONES UR-MCNC: NEGATIVE MG/DL — SIGNIFICANT CHANGE UP
LEUKOCYTE ESTERASE UR-ACNC: NEGATIVE — SIGNIFICANT CHANGE UP
LYMPHOCYTES # BLD AUTO: 1.74 K/UL — SIGNIFICANT CHANGE UP (ref 1–3.3)
LYMPHOCYTES # BLD AUTO: 26.9 % — SIGNIFICANT CHANGE UP (ref 13–44)
MAGNESIUM SERPL-MCNC: 2 MG/DL — SIGNIFICANT CHANGE UP (ref 1.6–2.6)
MCHC RBC-ENTMCNC: 26.9 PG — LOW (ref 27–34)
MCHC RBC-ENTMCNC: 32.3 GM/DL — SIGNIFICANT CHANGE UP (ref 32–36)
MCV RBC AUTO: 83.1 FL — SIGNIFICANT CHANGE UP (ref 80–100)
METHADONE UR-MCNC: NEGATIVE — SIGNIFICANT CHANGE UP
MONOCYTES # BLD AUTO: 0.38 K/UL — SIGNIFICANT CHANGE UP (ref 0–0.9)
MONOCYTES NFR BLD AUTO: 5.9 % — SIGNIFICANT CHANGE UP (ref 2–14)
NEUTROPHILS # BLD AUTO: 4.13 K/UL — SIGNIFICANT CHANGE UP (ref 1.8–7.4)
NEUTROPHILS NFR BLD AUTO: 63.8 % — SIGNIFICANT CHANGE UP (ref 43–77)
NITRITE UR-MCNC: NEGATIVE — SIGNIFICANT CHANGE UP
NRBC # BLD: 0 /100 WBCS — SIGNIFICANT CHANGE UP (ref 0–0)
NT-PROBNP SERPL-SCNC: 4492 PG/ML — HIGH (ref 0–300)
OPIATES UR-MCNC: NEGATIVE — SIGNIFICANT CHANGE UP
PCP SPEC-MCNC: SIGNIFICANT CHANGE UP
PCP UR-MCNC: NEGATIVE — SIGNIFICANT CHANGE UP
PH UR: 7 — SIGNIFICANT CHANGE UP (ref 5–8)
PLATELET # BLD AUTO: 196 K/UL — SIGNIFICANT CHANGE UP (ref 150–400)
POTASSIUM SERPL-MCNC: 4.2 MMOL/L — SIGNIFICANT CHANGE UP (ref 3.5–5.3)
POTASSIUM SERPL-SCNC: 4.2 MMOL/L — SIGNIFICANT CHANGE UP (ref 3.5–5.3)
PROT UR-MCNC: SIGNIFICANT CHANGE UP MG/DL
RBC # BLD: 4.32 M/UL — SIGNIFICANT CHANGE UP (ref 4.2–5.8)
RBC # FLD: 13 % — SIGNIFICANT CHANGE UP (ref 10.3–14.5)
SODIUM SERPL-SCNC: 139 MMOL/L — SIGNIFICANT CHANGE UP (ref 135–145)
SP GR SPEC: 1.02 — SIGNIFICANT CHANGE UP (ref 1–1.03)
THC UR QL: NEGATIVE — SIGNIFICANT CHANGE UP
TROPONIN T, HIGH SENSITIVITY RESULT: 58 NG/L — CRITICAL HIGH (ref 0–51)
UROBILINOGEN FLD QL: 0.2 MG/DL — SIGNIFICANT CHANGE UP (ref 0.2–1)
WBC # BLD: 6.47 K/UL — SIGNIFICANT CHANGE UP (ref 3.8–10.5)
WBC # FLD AUTO: 6.47 K/UL — SIGNIFICANT CHANGE UP (ref 3.8–10.5)

## 2024-07-11 PROCEDURE — 71046 X-RAY EXAM CHEST 2 VIEWS: CPT | Mod: 26

## 2024-07-11 PROCEDURE — 99285 EMERGENCY DEPT VISIT HI MDM: CPT | Mod: 25

## 2024-07-11 PROCEDURE — 93306 TTE W/DOPPLER COMPLETE: CPT | Mod: 26

## 2024-07-11 PROCEDURE — 93010 ELECTROCARDIOGRAM REPORT: CPT

## 2024-07-11 PROCEDURE — 70450 CT HEAD/BRAIN W/O DYE: CPT | Mod: 26,MC

## 2024-07-11 PROCEDURE — 99222 1ST HOSP IP/OBS MODERATE 55: CPT

## 2024-07-11 RX ORDER — HYDRALAZINE HYDROCHLORIDE 50 MG/1
10 TABLET ORAL ONCE
Refills: 0 | Status: COMPLETED | OUTPATIENT
Start: 2024-07-11 | End: 2024-07-11

## 2024-07-11 RX ORDER — ACETAMINOPHEN 325 MG
650 TABLET ORAL ONCE
Refills: 0 | Status: COMPLETED | OUTPATIENT
Start: 2024-07-11 | End: 2024-07-11

## 2024-07-11 RX ORDER — ASPIRIN 325 MG/1
81 TABLET, FILM COATED ORAL DAILY
Refills: 0 | Status: DISCONTINUED | OUTPATIENT
Start: 2024-07-11 | End: 2024-07-12

## 2024-07-11 RX ORDER — ACETAMINOPHEN 325 MG
975 TABLET ORAL ONCE
Refills: 0 | Status: COMPLETED | OUTPATIENT
Start: 2024-07-11 | End: 2024-07-11

## 2024-07-11 RX ORDER — HYDRALAZINE HYDROCHLORIDE 50 MG/1
50 TABLET ORAL ONCE
Refills: 0 | Status: COMPLETED | OUTPATIENT
Start: 2024-07-11 | End: 2024-07-11

## 2024-07-11 RX ORDER — AMLODIPINE BESYLATE 2.5 MG/1
10 TABLET ORAL ONCE
Refills: 0 | Status: COMPLETED | OUTPATIENT
Start: 2024-07-11 | End: 2024-07-11

## 2024-07-11 RX ORDER — ATORVASTATIN CALCIUM 20 MG/1
20 TABLET, FILM COATED ORAL AT BEDTIME
Refills: 0 | Status: DISCONTINUED | OUTPATIENT
Start: 2024-07-11 | End: 2024-07-12

## 2024-07-11 RX ORDER — HYDRALAZINE HYDROCHLORIDE 50 MG/1
50 TABLET ORAL EVERY 8 HOURS
Refills: 0 | Status: DISCONTINUED | OUTPATIENT
Start: 2024-07-11 | End: 2024-07-12

## 2024-07-11 RX ORDER — ACETAMINOPHEN 325 MG
650 TABLET ORAL EVERY 6 HOURS
Refills: 0 | Status: DISCONTINUED | OUTPATIENT
Start: 2024-07-11 | End: 2024-07-12

## 2024-07-11 RX ADMIN — Medication 975 MILLIGRAM(S): at 18:20

## 2024-07-11 RX ADMIN — Medication 650 MILLIGRAM(S): at 22:56

## 2024-07-11 RX ADMIN — HYDRALAZINE HYDROCHLORIDE 50 MILLIGRAM(S): 50 TABLET ORAL at 15:25

## 2024-07-11 RX ADMIN — AMLODIPINE BESYLATE 10 MILLIGRAM(S): 2.5 TABLET ORAL at 06:35

## 2024-07-11 RX ADMIN — Medication 650 MILLIGRAM(S): at 23:50

## 2024-07-11 RX ADMIN — Medication 975 MILLIGRAM(S): at 13:55

## 2024-07-11 RX ADMIN — HYDRALAZINE HYDROCHLORIDE 10 MILLIGRAM(S): 50 TABLET ORAL at 05:17

## 2024-07-11 RX ADMIN — HYDRALAZINE HYDROCHLORIDE 50 MILLIGRAM(S): 50 TABLET ORAL at 06:36

## 2024-07-11 RX ADMIN — Medication 975 MILLIGRAM(S): at 04:58

## 2024-07-11 RX ADMIN — Medication 650 MILLIGRAM(S): at 18:20

## 2024-07-11 RX ADMIN — ASPIRIN 81 MILLIGRAM(S): 325 TABLET, FILM COATED ORAL at 21:05

## 2024-07-11 RX ADMIN — HYDRALAZINE HYDROCHLORIDE 50 MILLIGRAM(S): 50 TABLET ORAL at 21:05

## 2024-07-11 RX ADMIN — ATORVASTATIN CALCIUM 20 MILLIGRAM(S): 20 TABLET, FILM COATED ORAL at 21:05

## 2024-07-11 RX ADMIN — HYDRALAZINE HYDROCHLORIDE 10 MILLIGRAM(S): 50 TABLET ORAL at 05:41

## 2024-07-11 NOTE — PATIENT PROFILE ADULT - FUNCTIONAL ASSESSMENT - DAILY ACTIVITY SCORE.
[facility] [address] [address]  Phone: [phone]          August 31, 2021     Patient: Macy Mckeon   YOB: 1983   Date of Visit: 8/26/2021       To Whom It May Concern: It is my medical opinion that Macy Mckeon may return to work on Tuesday September 7th, 2021. If you have any questions or concerns, please don't hesitate to call.     Sincerely,
20

## 2024-07-11 NOTE — ED PROVIDER NOTE - CLINICAL SUMMARY MEDICAL DECISION MAKING FREE TEXT BOX
65 yo male with h/o etoh abuse, depression,  h/o, HTN, CAD, MI, prostate CA s/p prostatectomy in the ER c/o headache and toothache. Pt reports he runs out of medications for BP(supposed to take 5 medications but can not recall names). Pt also admits using crack cocaine. Denies head injury, denies fever, chills, chest pain, abdominal pain, cold or flu-like symptoms. in the triage pt requesting food and tylenol for toothache. Pt noted to have /101, HR-50. No acute st elevation on ecg, no focal neuro deficits on exam. no signs of dental abscess. plan : labs, head CT, CXR, BP meds.

## 2024-07-11 NOTE — SBIRT NOTE ADULT - NSSBIRTDRGBRIEFINTDET_GEN_A_CORE
Patient reports he uses crack cocaine three times weekly. Patient also reports cigarette use. SW provided patient resources for outpatient substance use clinics in [place], [place] via print out.

## 2024-07-11 NOTE — ED ADULT NURSE NOTE - OBJECTIVE STATEMENT
Pt is a 64 year old male came to ED complaining of dizziness and lightheadedness. Pt known hx of HTN and TIA. As per pt he just recently discharged from [facility] as a case of TIA, he said that he was not able to take his anti-HTN meds last couple of days. Pt also is complaining of severe toothache.

## 2024-07-11 NOTE — H&P ADULT - NSHPLABSRESULTS_GEN_ALL_CORE
11.6   6.47  )-----------( 196      ( 11 Jul 2024 05:16 )             35.9   07-11    139  |  103  |  42<H>  ----------------------------<  139<H>  4.2   |  27  |  2.74<H>    Ca    9.4      11 Jul 2024 05:16  Mg     2.0     07-11    TPro  7.4  /  Alb  3.9  /  TBili  0.2  /  DBili  <0.2  /  AST  21  /  ALT  11  /  AlkPhos  55  07-11

## 2024-07-11 NOTE — DISCHARGE NOTE PROVIDER - NSDCCPCAREPLAN_GEN_ALL_CORE_FT
PRINCIPAL DISCHARGE DIAGNOSIS  Diagnosis: Hypertensive urgency  Assessment and Plan of Treatment: You were found to have elevated Blood Pressure on admission with symptoms of headache. This was likely in the setting of missing your blood pressure medication and in addition to the use of cocaine.   You should continue with your BP medication Hydralazine 50mg Three times a ta as well as your Amlodopine 10mg.      SECONDARY DISCHARGE DIAGNOSES  Diagnosis: Stage 3 chronic kidney disease  Assessment and Plan of Treatment: You have a history of Kidney disease on admission which appears to be from likely long standing high blood pressure. Uncontrolled high blood pressure can have an effect and worsen your kidnety function. You should follow up at Clinic or your primary doctor to monitor your function and take your BP medications as ordered.    Diagnosis: Polysubstance abuse  Assessment and Plan of Treatment: Your headache and elevated Blood pressure was likely due in the setting of your cocaine use as it can effect your blood pressue and heart. Stopping use of cocaine will help improve your symptoms and will keep you out of the hospital as it can deteroriate your health. Continue to seek treatment options from health clinics in [place] as there is support out there    Diagnosis: HLD (hyperlipidemia)  Assessment and Plan of Treatment: Continue with statin medication Atorvastatin 20mg    Diagnosis: CAD (coronary artery disease)  Assessment and Plan of Treatment: Continue with daily aspirin 81mg as you have a history of heart disease in the past     PRINCIPAL DISCHARGE DIAGNOSIS  Diagnosis: Hypertensive urgency  Assessment and Plan of Treatment: You were found to have elevated Blood Pressure on admission with symptoms of headache. This was likely in the setting of missing your blood pressure medication and in addition to the use of cocaine.   You should continue with your BP medication Hydralazine 50mg Three times a ta as well as your Amlodopine 10mg. You had an echo as well as a CT scan of your head that was done and was normal with no signs of an acute stroke or bleed      SECONDARY DISCHARGE DIAGNOSES  Diagnosis: Stage 3 chronic kidney disease  Assessment and Plan of Treatment: You have a history of Kidney disease on admission which appears to be from likely long standing high blood pressure. Uncontrolled high blood pressure can have an effect and worsen your kidnety function. You should follow up at Clinic or your primary doctor to monitor your function and take your BP medications as ordered.    Diagnosis: Polysubstance abuse  Assessment and Plan of Treatment: Your headache and elevated Blood pressure was likely due in the setting of your cocaine use as it can effect your blood pressue and heart. Stopping use of cocaine will help improve your symptoms and will keep you out of the hospital as it can deteroriate your health. Continue to seek treatment options from health clinics in [place] as there is support out there    Diagnosis: HLD (hyperlipidemia)  Assessment and Plan of Treatment: Continue with statin medication Atorvastatin 20mg    Diagnosis: CAD (coronary artery disease)  Assessment and Plan of Treatment: Continue with daily aspirin 81mg as you have a history of heart disease in the past     PRINCIPAL DISCHARGE DIAGNOSIS  Diagnosis: Hypertensive urgency  Assessment and Plan of Treatment: You were found to have elevated blood pressure on admission with symptoms of headache. This was likely in the setting of missing your blood pressure medication and in addition to the use of cocaine.   Please take your BP medication hydralazine 75mg three times daily. (We increased this dosage from your home dose).  You had an echocardiogram of your heart that was normal. You also had a CT scan of your head that was done and showed no signs of acute stroke or bleed.  Please follow up with Dr. Sargent within 2 weeks to further manage your blood pressure.      SECONDARY DISCHARGE DIAGNOSES  Diagnosis: Tooth pain  Assessment and Plan of Treatment: You endorsed toothache on this admission that you have experienced for a while. You did not spike a fever while you were here and your white blood cell count was normal. We recommend you follow up with your dental clinic. Should you experience sudden fevers, chills, swelling of your teeth or redness we recommend you return to the emergency immediately.    Diagnosis: Stage 3 chronic kidney disease  Assessment and Plan of Treatment: You have a history of Kidney disease on admission which appears to be from likely long standing high blood pressure. Uncontrolled high blood pressure can have an effect and worsen your kidney function. You should follow up at clinic or your primary doctor to monitor your function and take your BP medications as ordered.    Diagnosis: CAD (coronary artery disease)  Assessment and Plan of Treatment: Continue with aspirin 81mg daily as you have a history of heart disease in the past.    Diagnosis: HLD (hyperlipidemia)  Assessment and Plan of Treatment: Continue with statin medication atorvastatin 20mg daily at bedtime.    Diagnosis: History of TIAs  Assessment and Plan of Treatment: Continue daily aspirin and atorvastatin to minimize your stroke risk as High blood pressure and high cholesterol is a risk for stroke. Continue your daily medication and follow up.    Diagnosis: Polysubstance abuse  Assessment and Plan of Treatment: Your headache and elevated blood pressure was likely due in the setting of your cocaine use as it can effect your blood pressue and heart. Stopping use of cocaine will help improve your symptoms and will keep you out of the hospital as it can deteroriate your health. Continue to seek treatment options from health clinics in [place] as there is support out there.

## 2024-07-11 NOTE — H&P ADULT - PROBLEM SELECTOR PLAN 5
Last cocaine use 7/11/24, last EtOH _______   - Continue: ____CIWA???_____, monitor for withdrawal symptoms   - Follow up UTox     #Social Needs   - Undomiciled   - SW consulted; possible shelter placement     DVT ppx:_______________________  F: ____   E: Keep K > 4, Mg > 2  N: DASH/TLC     Code: Full  Dispo: pending clinical progression     Case discussed with ___________________________ -Last cocaine use within the last 48 hours, last EtOH years ago  - Pending UTOX     #Social Needs   - Lives in Shelter [facility]   - SW consulted; possible shelter placement      E: Keep K > 4, Mg > 2  N: DASH/TLC     Code: Full  Dispo: pending clinical progression   Case discussed with

## 2024-07-11 NOTE — ED PROVIDER NOTE - CONSTITUTIONAL, MLM
awake, alert, oriented to person, place, time/situation , requesting food and tylenol for toothache normal...

## 2024-07-11 NOTE — DISCHARGE NOTE PROVIDER - PROVIDER TOKENS
FREE:[LAST:[Park],FIRST:[Antonietta Menon],PHONE:[phone]],FAX:[(   )    -],ADDRESS:[[facility] at [address]  [phone]  [address] [place] [address]],FOLLOWUP:[2 weeks]]

## 2024-07-11 NOTE — PATIENT PROFILE ADULT - FALL HARM RISK - HARM RISK INTERVENTIONS
Assistance with ambulation/Assistance OOB with selected safe patient handling equipment/Communicate Risk of Fall with Harm to all staff/Discuss with provider need for PT consult/Monitor gait and stability/Reinforce activity limits and safety measures with patient and family/Sit up slowly, dangle for a short time, stand at bedside before walking/Tailored Fall Risk Interventions/Visual Cue: Yellow wristband and red socks/Bed in lowest position, wheels locked, appropriate side rails in place/Call bell, personal items and telephone in reach/Instruct patient to call for assistance before getting out of bed or chair/Non-slip footwear when patient is out of bed/[place] to call system/Physically safe environment - no spills, clutter or unnecessary equipment/Purposeful Proactive Rounding/Room/bathroom lighting operational, light cord in reach

## 2024-07-11 NOTE — ED PROVIDER NOTE - PROGRESS NOTE DETAILS
Trop-58, uncontrolled HTN with severe HA, no acute pathology on head CTwill admit for further management Trop-58, uncontrolled HTN with severe HA, no acute pathology on head CT. pt with hypertensive urgency, will admit for further management

## 2024-07-11 NOTE — H&P ADULT - PROBLEM SELECTOR PLAN 2
On chart review, pt with hx CAD - unclear if PCI in past   - Follow up cardiac risk labs On chart review, pt with hx CAD - unclear if PCI in past   - Follow up cardiac risk labs    #TIA   Hx TIAs per prior note  - Continue: ____ ASA/Statin_____ for primary prevention CKD Unknown baseline Creatinine last labs noted were 1.7 in 2020   Likely in setting of uncontrolled HTN   Monitor Renal Function

## 2024-07-11 NOTE — H&P ADULT - NS ATTEND AMEND GEN_ALL_CORE FT
63 yo man PMHx of HTN not on meds who was admitted for HTN urgency and NATALIYA on CKD    Assessment  1. HTN urgency  2. NATALIYA on CKD  More likely progression of CKD from uncontrolled HTN rather than acute end organ damage from HTN emergency  3. HsTrop elevation    Plan  1. TTE  2. PO hydralazine 50mg q8h  3. HsTrop elevation 2/2 HTN and NATALIYA/CKD, no CP, no inpatient ischemic workup needed  4. Anticipate DC back to shelter tmrw    During non face-to-face time, I reviewed relevant portions of the patient’s medical record. During face-to-face time, I took a relevant history and examined the patient. I also explained differential diagnoses, relevant cardiac diagnoses, workup, and management plan, which required a moderate level of medical decision making. I answered all questions related to the patient's medical conditions.     Antonietta Sargent M.D.  Attending Cardiologist  [facility]

## 2024-07-11 NOTE — H&P ADULT - PROBLEM SELECTOR PLAN 1
SBP 220s on ED arrival   - s/p hydralazine 10 mg IVPx2, hydralazine 50 mg POx1, amlodipine 10 mg POx1  - Follow up TTE  - Follow up cardiac risk labs (A1c, lipid panel, TSH)   - Continuous telemetry monitoring, pulse oximetry, VS q4h SBP 220s on ED arrival   For BP control continue with hydral 50mg q 8 for now   - s/p hydralazine 10 mg IVPx2, hydralazine 50 mg POx1, amlodipine 10 mg POx1  - Follow up TTE  - Follow up cardiac risk labs (A1c, lipid panel, TSH)   - Continuous telemetry monitoring, pulse oximetry, VS q4h

## 2024-07-11 NOTE — ED ADULT TRIAGE NOTE - CHIEF COMPLAINT QUOTE
pt. states he ran out of BP medication and feels that his BP is high, c/o h/a, generalized weakness, lightheadedness. pt. states: i'm homeless and I didn't eat all day.

## 2024-07-11 NOTE — H&P ADULT - NSHPSOCIALHISTORY_GEN_ALL_CORE
undomiciled Reports difficult with cocaine addiction and reports last taken less than 48 hours ago   Denies any current alcohol use but reports last drink was many years ago   Smokes cigarettes intermittently at times non daily smoker

## 2024-07-11 NOTE — DISCHARGE NOTE PROVIDER - NSDCMRMEDTOKEN_GEN_ALL_CORE_FT
amLODIPine 10 mg oral tablet: 1 tab(s) orally once a day  aspirin 81 mg oral delayed release tablet: 1 tab(s) orally once a day   Aspirin EC 81 mg oral delayed release tablet: 1 tab(s) orally once a day  atorvastatin 20 mg oral tablet: 1 tab(s) orally once a day (at bedtime)  hydrALAZINE 25 mg oral tablet: 3 tab(s) orally every 8 hours

## 2024-07-11 NOTE — H&P ADULT - HISTORY OF PRESENT ILLNESS
63 y/o undomiciled M with PMHx ETOH abuse (last drink ___________), depression, HTN, CAD s/p ?MI (unclear if PCI), ?CHF, Asthma, Prostate CA s/p prostatectomy, who presented to [facility] ED reporting headache and toothache. Patient admits to crack cocaine use earlier this morning and developed a headache thereafter. He reports months-long history of toothache. Otherwise patient denies: _________    ED Course:   - Vitals (on arrival): /101 mmHg, HR 50 bpm, RR 18 breaths/min, Temp 97.9F, spO2 99% on room air  - Labs: WBC 6.47, H/H 11.6/35.9, , Na 139, K 4.2, Mg 2.0, BUN/Cr 42/2.74, eGFR 25, Glu 139, HsTrop T 58, , proBNP 4492  - Diagnostics:         o EKG (7/11/24): ____        o CXR (7/11/24): ______         o CT Head Non-Con (7/11/24): no acute ICH or dermarcated infarct; chronic infarcts in R basal ganglia and L occipital lobe, probable small chronic infarct L basal ganglia.  - Interventions: Tylenol 975 mg PO x1, Amlodipine 10 mg POx1, Hydralazine 50 mg POx1, Hydralazine 10 mg IVPx2  65 y/o Shelter Resident with PMHx ETOH abuse per chart review but denies (last drink years ago), Depression, HTN, CAD ( , ?CHF, Asthma, Prostate CA s/p prostatectomy, who presented to [facility] ED reporting headache and toothache. He also reports being admitted for a stroke in the last 3 weeks but no surgery and was discharged home. after 5-6 days. Patient admits to cocaine use in the  and developed a headache thereafter and reports being incarcerated overnight and released in the morning. He reports months-long history of toothache that comes and goes and was given referral to see a dental clinic.     Denies CP, SOB, dizziness, palpitations, orthopnea/PND, leg swelling, LOC, bleeding, melena/hematochezia, fever, chills, URI symptoms, or recent illness.     ED Course:   - Vitals (on arrival): /101 mmHg, HR 50 bpm, RR 18 breaths/min, Temp 97.9F, spO2 99% on room air  - Labs: WBC 6.47, H/H 11.6/35.9, , Na 139, K 4.2, Mg 2.0, BUN/Cr 42/2.74, eGFR 25, Glu 139, HsTrop T 58, , proBNP 4492  - Diagnostics:         o EKG (7/11/24): LVH        o CT Head Non-Con (7/11/24): no acute ICH or dermarcated infarct; chronic infarcts in R basal ganglia and L occipital lobe, probable small chronic infarct L basal ganglia.  - Interventions: Tylenol 975 mg PO x1, Amlodipine 10 mg POx1, Hydralazine 50 mg POx1, Hydralazine 10 mg IVPx2

## 2024-07-11 NOTE — ED PROVIDER NOTE - OBJECTIVE STATEMENT
63 yo male with h/o etoh abuse, psychiatric h/o, HTN, CAD, MI, prostate CA s/p prostatectomy in the Er c/o headache and toothache. Pt reports he runs out of medications for BP(supposed to take 5 medications but can not recall names). Pt also admits using crack cocaine.

## 2024-07-11 NOTE — H&P ADULT - ASSESSMENT
63 y/o undomiciled M with PMHx ETOH abuse (last drink ___________), depression, HTN, CAD s/p ?MI (unclear if PCI), ?CHF, Asthma, Prostate CA s/p prostatectomy, admitted to cardiac telemetry for further management of hypertensive urgency.  63 y/o Shelter Resident with PMHx ETOH abuse per chart review but denies (last drink years ago), Depression, HTN, CAD ( , ?CHF, Asthma, Prostate CA s/p prostatectomy, who presented to [facility] ED reporting headache and toothache. He also reports being admitted for a stroke in the last 3 weeks but no surgery and was discharged home. after 5-6 days. Patient admits to cocaine use in the last 48 hours and developed a headache thereafter and reports being incarcerated overnight and released in the morning. He reports months-long history of toothache that comes and goes and was given referral to see a dental clinic.     Denies CP, SOB, dizziness, palpitations, orthopnea/PND, leg swelling, LOC, bleeding, melena/hematochezia, fever, chills, URI symptoms, or recent illness.     ED Course:   - Vitals (on arrival): /101 mmHg, HR 50 bpm, RR 18 breaths/min, Temp 97.9F, spO2 99% on room air  - Labs: WBC 6.47, H/H 11.6/35.9, , Na 139, K 4.2, Mg 2.0, BUN/Cr 42/2.74, eGFR 25, Glu 139, HsTrop T 58, , proBNP 4492  - Diagnostics:         o EKG (7/11/24): LVH        o CT Head Non-Con (7/11/24): no acute ICH or dermarcated infarct; chronic infarcts in R basal ganglia and L occipital lobe, probable small chronic infarct L basal ganglia.  - Interventions: Tylenol 975 mg PO x1, Amlodipine 10 mg POx1, Hydralazine 50 mg POx1, Hydralazine 10 mg IVPx2  63 [facility] poor [facility] Shel[facility] with PMHx ETOH abuse per chart review but denies (last drink years ago), Depression, HTN, CAD ( , ?CHF, Asthma, Prostate CA s/p prostatectomy, who presented to [facility] ED reporting headache and toothache. He also reports being admitted for a stroke in the last 3 weeks but no surgery and was discharged home. after 5-6 days. Patient admits to cocaine use in the last 48 hours and developed a headache thereafter and reports being incarcerated overnight and released in the morning. He reports months-long history of toothache that comes and goes and was given referral to see a dental clinic.     Denies CP, SOB, dizziness, palpitations, orthopnea/PND, leg swelling, LOC, bleeding, melena/hematochezia, fever, chills, URI symptoms, or recent illness.     ED Course:   - Vitals (on arrival): /101 mmHg, HR 50 bpm, RR 18 breaths/min, Temp 97.9F, spO2 99% on room air  - Labs: WBC 6.47, H/H 11.6/35.9, , Na 139, K 4.2, Mg 2.0, BUN/Cr 42/2.74, eGFR 25, Glu 139, HsTrop T 58, , proBNP 4492  - Diagnostics:         o EKG (7/11/24): LVH        o CT Head Non-Con (7/11/24): no acute ICH or dermarcated infarct; chronic infarcts in R basal ganglia and L occipital lobe, probable small chronic infarct L basal ganglia.  - Interventions: Tylenol 975 mg PO x1, Amlodipine 10 mg POx1, Hydralazine 50 mg POx1, Hydralazine 10 mg IVPx2     Admitted for HTN urgency

## 2024-07-11 NOTE — DISCHARGE NOTE PROVIDER - HOSPITAL COURSE
63 y/o Shelter Resident with PMHx ETOH abuse per chart review but denies (last drink years ago), Depression, HTN, CAD ( , ?CHF, Asthma, Prostate CA s/p prostatectomy, who presented to [facility] ED reporting headache and toothache. He also reports being admitted for a stroke in the last 3 weeks but no surgery and was discharged home. after 5-6 days. Patient admits to cocaine use in the  and developed a headache thereafter and reports being incarcerated overnight and released in the morning. He reports months-long history of toothache that comes and goes and was given referral to see a dental clinic.     Denies CP, SOB, dizziness, palpitations, orthopnea/PND, leg swelling, LOC, bleeding, melena/hematochezia, fever, chills, URI symptoms, or recent illness.     ED Course:   - Vitals (on arrival): /101 mmHg, HR 50 bpm, RR 18 breaths/min, Temp 97.9F, spO2 99% on room air  - Labs: WBC 6.47, H/H 11.6/35.9, , Na 139, K 4.2, Mg 2.0, BUN/Cr 42/2.74, eGFR 25, Glu 139, HsTrop T 58, , proBNP 4492  - Diagnostics:         o EKG (7/11/24): LVH        o CT Head Non-Con (7/11/24): no acute ICH or dermarcated infarct; chronic infarcts in R basal ganglia and L occipital lobe, probable small chronic infarct L basal ganglia.  - Interventions: Tylenol 975 mg PO x1, Amlodipine 10 mg POx1, Hydralazine 50 mg POx1, Hydralazine 10 mg IVPx2    Patient admitted to 5 uris and upon arrival  to floor BP well controlled 140s/90. Patient had ECHO done 7/11/2024 Normal left ventricular size. Moderate symmetric left ventricular hypertrophy. Normal left ventricular systolic function. Grade I left ventricular diastolic dysfunction. Normal right ventricular size and systolic function. Dilated left atrium. Aortic sclerosis without significant stenosis. Mild aortic regurgitation. No evidence of pulmonary hypertension No pericardial effusion. No prior echois available for comparison.    Patient started on Hydral 50mg PO TID, BP on floor remained well controlled BP ranging from 140s to 150s, patient with improvement in symptoms   In regards to HsTrop elevation 2/2 HTN and NATALIYA/CKD, no CP, no inpatient ischemic workup needed patient remained CP free         63 y/o Shelter Resident with PMHx ETOH abuse per chart review but denies (last drink years ago), Depression, HTN, CAD ( , ?CHF, Asthma, Prostate CA s/p prostatectomy, who presented to [facility] ED reporting headache and toothache. He also reports being admitted for a stroke in the last 3 weeks but no surgery and was discharged home. after 5-6 days. Patient admits to cocaine use in the  and developed a headache thereafter and reports being incarcerated overnight and released in the morning. He reports months-long history of toothache that comes and goes and was given referral to see a dental clinic.     Denies CP, SOB, dizziness, palpitations, orthopnea/PND, leg swelling, LOC, bleeding, melena/hematochezia, fever, chills, URI symptoms, or recent illness.     ED Course:   - Vitals (on arrival): /101 mmHg, HR 50 bpm, RR 18 breaths/min, Temp 97.9F, spO2 99% on room air  - Labs: WBC 6.47, H/H 11.6/35.9, , Na 139, K 4.2, Mg 2.0, BUN/Cr 42/2.74, eGFR 25, Glu 139, HsTrop T 58, , proBNP 4492  - Diagnostics:         o EKG (7/11/24): LVH        o CT Head Non-Con (7/11/24): no acute ICH or demarcated infarct; chronic infarcts in R basal ganglia and L occipital lobe, probable small chronic infarct L basal ganglia.  - Interventions: Tylenol 975 mg PO x1, Amlodipine 10 mg POx1, Hydralazine 50 mg POx1, Hydralazine 10 mg IVPx2    Patient admitted to 5 uris and upon arrival  to floor BP well controlled 140s/90. Patient had ECHO done 7/11/2024 Normal left ventricular size. Moderate symmetric left ventricular hypertrophy. Normal left ventricular systolic function. Grade I left ventricular diastolic dysfunction. Normal right ventricular size and systolic function. Dilated left atrium. Aortic sclerosis without significant stenosis. Mild aortic regurgitation. No evidence of pulmonary hypertension No pericardial effusion. No prior echo s available for comparison.    Patient started on Hydral 50mg PO TID, BP on floor remained well controlled BP ranging from 140s to 150s, patient with improvement in symptoms   In regards to HsTrop elevation 2/2 HTN and NATALIYA/CKD, no CP, no inpatient ischemic workup needed patient remained CP free         65 y/o Shelter Resident with PMHx ETOH abuse per chart review but denies (last drink years ago), Depression, HTN, CAD , ?CHF, Asthma, Prostate CA s/p prostatectomy, who presented to [facility] ED reporting headache and toothache. He also reported being admitted for a stroke in the last 3 weeks but no surgery and was discharged home. after 5-6 days. Patient admitted to cocaine use in the past and developed a headache thereafter and reports being incarcerated overnight and released in the morning. He reports months-long history of toothache that comes and goes and was given referral to see a dental clinic.     Denies CP, SOB, dizziness, palpitations, orthopnea/PND, leg swelling, LOC, bleeding, melena/hematochezia, fever, chills, URI symptoms, or recent illness.     ED Course:   - Vitals (on arrival): /101 mmHg, HR 50 bpm, RR 18 breaths/min, Temp 97.9F, spO2 99% on room air  - Labs: WBC 6.47, H/H 11.6/35.9, , Na 139, K 4.2, Mg 2.0, BUN/Cr 42/2.74, eGFR 25, Glu 139, HsTrop T 58, , proBNP 4492  - Diagnostics:         o EKG (7/11/24): LVH        o CT Head Non-Con (7/11/24): no acute ICH or demarcated infarct; chronic infarcts in R basal ganglia and L occipital lobe, probable small chronic infarct L basal ganglia.  - Interventions: Tylenol 975 mg PO x1, Amlodipine 10 mg POx1, Hydralazine 50 mg POx1, Hydralazine 10 mg IVPx2    Patient admitted to 5 uris and upon arrival to floor BP well controlled 140s/90. Patient had ECHO done 7/11/2024 revealing normal left ventricular size. Moderate symmetric left ventricular hypertrophy. Normal left ventricular systolic function. Grade I left ventricular diastolic dysfunction. Normal right ventricular size and systolic function. Dilated left atrium. Aortic sclerosis without significant stenosis. Mild aortic regurgitation. No evidence of pulmonary hypertension No pericardial effusion.     Patient started on Hydral 75mg PO TID, BP on floor remained well controlled BP ranging from 140s to 150s, patient with improvement in symptoms     Pt has elevated troponin that are likely secondary to hypertension and acute kidney injury and no chest pain, there is no indication for inpatient ischemic workup.    seen and examined at bedside this AM without any complaints or events overnight, VSS, labs and telemetry reviewed and pt stable for discharge as discussed with Dr. Sargent. Pt has received appropriate discharge instructions, including medication regimen, access site management and follow up with  in 1-2 weeks.    Discharge medications: ASA 81mg QD, Plavix 75mg QD, ______________.    Pt discharge copies detail cardiovascular history, medication testing/treatments OR has created a patient portal account and instructed to provider their records at their 1st appointment.             63 y/o Shelter Resident with PMHx ETOH abuse per chart review but denies (last drink years ago), Depression, HTN, CAD , ?CHF, Asthma, Prostate CA s/p prostatectomy, who presented to [facility] ED reporting headache and toothache. He also reported being admitted for a stroke in the last 3 weeks but no surgery and was discharged home. after 5-6 days. Patient admitted to cocaine use in the past and developed a headache thereafter and reports being incarcerated overnight and released in the morning. He reports months-long history of toothache that comes and goes and was given referral to see a dental clinic.     Denies CP, SOB, dizziness, palpitations, orthopnea/PND, leg swelling, LOC, bleeding, melena/hematochezia, fever, chills, URI symptoms, or recent illness.     ED Course:   - Vitals (on arrival): /101 mmHg, HR 50 bpm, RR 18 breaths/min, Temp 97.9F, spO2 99% on room air  - Labs: WBC 6.47, H/H 11.6/35.9, , Na 139, K 4.2, Mg 2.0, BUN/Cr 42/2.74, eGFR 25, Glu 139, HsTrop T 58, , proBNP 4492  - UTox 7/11/24: + Cocaine  - Diagnostics:         o EKG (7/11/24): LVH        o CT Head Non-Con (7/11/24): no acute ICH or demarcated infarct; chronic infarcts in R basal ganglia and L occipital lobe, probable small chronic infarct L basal ganglia.  - Interventions: Tylenol 975 mg PO x1, Amlodipine 10 mg POx1, Hydralazine 50 mg POx1, Hydralazine 10 mg IVPx2    Patient admitted to 5 uris and upon arrival to floor BP well controlled 140s/90. Patient had ECHO done 7/11/2024 revealing EF=55-60%. Moderate symmetric LVH. Normal LVH, Grade LVDD, dilated LA, mild AR, no pulmonary hypertension or pericardial effusion.     Patient started on hydralazine 75mg PO TID, BP on floor remained well controlled BP ranging from 140s to 160s, patient with improvement in symptoms.     Pt has elevated troponin that are likely secondary to hypertension and acute kidney injury and no chest pain, there is no indication for inpatient ischemic workup.    Pt seen and examined at bedside this AM without any complaints or events overnight, VSS, labs and telemetry reviewed and pt stable for discharge as discussed with Dr. Sargent. Pt has received appropriate discharge instructions, including medication regimen, access site management and follow up with Dr. Sargent in 1-2 weeks.    Discharge medications: ASA 81mg QD, hydralazine 75mg three times daily, atorvastatin 20mg daily at bedtime    Pt discharge copies detail cardiovascular history, medication testing/treatments OR has created a patient portal account and instructed to provider their records at their 1st appointment.             65 y/o Shelter Resident with PMHx ETOH abuse per chart review but denies (last drink years ago), Depression, HTN, CAD , ?CHF, Asthma, Prostate CA s/p prostatectomy, who presented to [facility] ED reporting headache and toothache. He also reported being admitted for a stroke in the last 3 weeks but no surgery and was discharged home. after 5-6 days. Patient admitted to cocaine use in the past and developed a headache thereafter and reports being incarcerated overnight and released in the morning. He reports months-long history of toothache that comes and goes and was given referral to see a dental clinic.     Denies CP, SOB, dizziness, palpitations, orthopnea/PND, leg swelling, LOC, bleeding, melena/hematochezia, fever, chills, URI symptoms, or recent illness.     ED Course:   - Vitals (on arrival): /101 mmHg, HR 50 bpm, RR 18 breaths/min, Temp 97.9F, spO2 99% on room air  - Labs: WBC 6.47, H/H 11.6/35.9, , Na 139, K 4.2, Mg 2.0, BUN/Cr 42/2.74, eGFR 25, Glu 139, HsTrop T 58, , proBNP 4492  - UTox 7/11/24: + Cocaine  - Diagnostics:         o EKG (7/11/24): LVH        o CT Head Non-Con (7/11/24): no acute ICH or demarcated infarct; chronic infarcts in R basal ganglia and L occipital lobe, probable small chronic infarct L basal ganglia.  - Interventions: Tylenol 975 mg PO x1, Amlodipine 10 mg POx1, Hydralazine 50 mg POx1, Hydralazine 10 mg IVPx2    Patient admitted to 5 Uris and upon arrival to floor BP well controlled 140s/90. Patient had ECHO done 7/11/2024 revealing EF=55-60%. Moderate symmetric LVH. Normal LVH, Grade LVDD, dilated LA, mild AR, no pulmonary hypertension or pericardial effusion.     Patient started on hydralazine 75mg PO TID, BP on floor remained well controlled BP ranging from 140s to 160s, patient with improvement in symptoms.     Pt has elevated troponins that are likely secondary to hypertension and acute kidney injury and pt had no chest pain. No indication for inpatient ischemic workup.     Pt seen and examined at bedside this AM without any complaints or events overnight, VSS, labs and telemetry reviewed and pt stable for discharge as discussed with Dr. Sargent. Pt has received appropriate discharge instructions, including medication regimen, access site management and follow up with Dr. Sargent in 1-2 weeks.    Discharge medications: ASA 81mg QD, hydralazine 75mg three times daily, atorvastatin 20mg daily at bedtime    Pt discharge copies detail cardiovascular history, medication testing/treatments OR has created a patient portal account and instructed to provider their records at their 1st appointment.             65 y/o Shelter Resident with PMHx ETOH abuse per chart review but denies (last drink years ago), Depression, HTN, CAD , ?CHF, Asthma, Prostate CA s/p prostatectomy, who presented to [facility] ED reporting headache and toothache. He also reported being admitted for a stroke in the last 3 weeks but no surgery and was discharged home. after 5-6 days. Patient admitted to cocaine use in the past and developed a headache thereafter and reports being incarcerated overnight and released in the morning. He reports months-long history of toothache that comes and goes and was given referral to see a dental clinic.     Denies CP, SOB, dizziness, palpitations, orthopnea/PND, leg swelling, LOC, bleeding, melena/hematochezia, fever, chills, URI symptoms, or recent illness.     ED Course:   - Vitals (on arrival): /101 mmHg, HR 50 bpm, RR 18 breaths/min, Temp 97.9F, spO2 99% on room air  - Labs: WBC 6.47, H/H 11.6/35.9, , Na 139, K 4.2, Mg 2.0, BUN/Cr 42/2.74, eGFR 25, Glu 139, HsTrop T 58, , proBNP 4492  - UTox 7/11/24: + Cocaine  - Diagnostics:         o EKG (7/11/24): LVH        o CT Head Non-Con (7/11/24): no acute ICH or demarcated infarct; chronic infarcts in R basal ganglia and L occipital lobe, probable small chronic infarct L basal ganglia.  - Interventions: Tylenol 975 mg PO x1, Amlodipine 10 mg POx1, Hydralazine 50 mg POx1, Hydralazine 10 mg IVPx2    Patient admitted to 5 Uris and upon arrival to floor BP well controlled 140s/90. Patient had ECHO done 7/11/2024 revealing EF=55-60%. Moderate symmetric LVH. Grade LVDD, dilated LA, mild AR, no pulmonary hypertension or pericardial effusion.     Patient started on hydralazine 75mg PO TID, BP on floor remained well controlled BP ranging from 140s to 160s, patient with improvement in symptoms.     Pt has elevated troponins that are likely secondary to hypertension and acute kidney injury and pt had no chest pain. No indication for inpatient ischemic workup.     Pt seen and examined at bedside this AM without any complaints or events overnight, VSS, labs and telemetry reviewed and pt stable for discharge as discussed with Dr. Sargent. Pt has received appropriate discharge instructions, including medication regimen, access site management and follow up with Dr. Sargent in 1-2 weeks.    Discharge medications: ASA 81mg QD, hydralazine 75mg three times daily, atorvastatin 20mg daily at bedtime    Pt discharge copies detail cardiovascular history, medication testing/treatments OR has created a patient portal account and instructed to provider their records at their 1st appointment.

## 2024-07-11 NOTE — ED PROVIDER NOTE - PSYCHIATRIC, MLM
Alert and oriented to person, place, time/situation. denies SI/HI, no apparent risk to self or others.

## 2024-07-11 NOTE — H&P ADULT - NSICDXPASTMEDICALHX_GEN_ALL_CORE_FT
PAST MEDICAL HISTORY:  Asthma     CAD (coronary artery disease)     Carpal tunnel syndrome R hand    CHF (congestive heart failure)     Hyperlipidemia     Hypertension     Hypertension     Myocardial infarction, unspecified MI type, unspecified artery     Substance abuse     TIA (transient ischemic attack)

## 2024-07-11 NOTE — ED PROVIDER NOTE - ATTENDING APP SHARED VISIT CONTRIBUTION OF CARE
64M hx htn, c/o elevated BP. pt states he ran out of his medication and doesn't remember what they are. c/o HA, generalized weakness. no chest pain. states hasn't eaten today. also  c/o tooth pain.   gen- nad  heent- ncat  cv -rrr  lungs -ctab  neuro -aox3, steady gait, diaz   no focal neuro deficits, pending CT head, given hydralazine for elevated BP.

## 2024-07-11 NOTE — ED ADULT NURSE NOTE - NSFALLUNIVINTERV_ED_ALL_ED
Bed/Stretcher in lowest position, wheels locked, appropriate side rails in place/Call bell, personal items and telephone in reach/Instruct patient to call for assistance before getting out of bed/chair/stretcher/Non-slip footwear applied when patient is off stretcher/[place] to call system/Physically safe environment - no spills, clutter or unnecessary equipment/Purposeful proactive rounding/Room/bathroom lighting operational, light cord in reach

## 2024-07-11 NOTE — H&P ADULT - PROBLEM SELECTOR PLAN 4
Last cocaine use 7/11/24, last EtOH _______   - Continue: ____CIWA???_____, monitor for withdrawal symptoms   - Follow up UTox     #Social Needs   - Undomiciled   - SW consulted; possible shelter placement     DVT ppx:_______________________  F: ____   E: Keep K > 4, Mg > 2  N: DASH/TLC     Code: Full  Dispo: pending clinical progression     Case discussed with ___________________________ Follow up lipid panel   - Consider statin initiation

## 2024-07-11 NOTE — DISCHARGE NOTE PROVIDER - CARE PROVIDER_API CALL
Antonietta Sargent [facility] at [address]  [phone] [address], [place] [address]  Phone: [phone]  Fax: (   )    -  Follow Up Time: 2 weeks

## 2024-07-11 NOTE — H&P ADULT - PROBLEM SELECTOR PLAN 3
BUN/Cr 42/2.74, eGFR 25; no known baseline   - Follow up urine lytes  - Avoid nephrotoxins, renally dose meds BUN/Cr 42/2.74, eGFR 25; no known baseline   - Follow up urine lytes  - Avoid nephrotoxins, renally dose meds    #Prostate CA   - s/p prostatectomy 12 years ago On chart review, pt with hx CAD  reports being told he had a heart attack but no stents placed was medically managed per patient   - Follow up cardiac risk labs  - ECHO pending     #TIA per history   Hx TIAs per prior note  - Continue: 82  ASA and statin

## 2024-07-12 VITALS
RESPIRATION RATE: 17 BRPM | HEART RATE: 46 BPM | OXYGEN SATURATION: 98 % | SYSTOLIC BLOOD PRESSURE: 166 MMHG | DIASTOLIC BLOOD PRESSURE: 84 MMHG | TEMPERATURE: 98 F

## 2024-07-12 LAB
A1C WITH ESTIMATED AVERAGE GLUCOSE RESULT: 5.8 % — HIGH (ref 4–5.6)
ANION GAP SERPL CALC-SCNC: 10 MMOL/L — SIGNIFICANT CHANGE UP (ref 5–17)
BUN SERPL-MCNC: 38 MG/DL — HIGH (ref 7–23)
CALCIUM SERPL-MCNC: 9 MG/DL — SIGNIFICANT CHANGE UP (ref 8.4–10.5)
CHLORIDE SERPL-SCNC: 103 MMOL/L — SIGNIFICANT CHANGE UP (ref 96–108)
CHOLEST SERPL-MCNC: 175 MG/DL — SIGNIFICANT CHANGE UP
CO2 SERPL-SCNC: 23 MMOL/L — SIGNIFICANT CHANGE UP (ref 22–31)
CREAT SERPL-MCNC: 2.28 MG/DL — HIGH (ref 0.5–1.3)
EGFR: 31 ML/MIN/1.73M2 — LOW
ESTIMATED AVERAGE GLUCOSE: 120 MG/DL — HIGH (ref 68–114)
GLUCOSE SERPL-MCNC: 89 MG/DL — SIGNIFICANT CHANGE UP (ref 70–99)
HCT VFR BLD CALC: 37.6 % — LOW (ref 39–50)
HDLC SERPL-MCNC: 74 MG/DL — SIGNIFICANT CHANGE UP
HGB BLD-MCNC: 11.5 G/DL — LOW (ref 13–17)
LIPID PNL WITH DIRECT LDL SERPL: 94 MG/DL — SIGNIFICANT CHANGE UP
MAGNESIUM SERPL-MCNC: 2.1 MG/DL — SIGNIFICANT CHANGE UP (ref 1.6–2.6)
MCHC RBC-ENTMCNC: 26.1 PG — LOW (ref 27–34)
MCHC RBC-ENTMCNC: 30.6 GM/DL — LOW (ref 32–36)
MCV RBC AUTO: 85.5 FL — SIGNIFICANT CHANGE UP (ref 80–100)
NON HDL CHOLESTEROL: 101 MG/DL — SIGNIFICANT CHANGE UP
NRBC # BLD: 0 /100 WBCS — SIGNIFICANT CHANGE UP (ref 0–0)
PLATELET # BLD AUTO: 182 K/UL — SIGNIFICANT CHANGE UP (ref 150–400)
POTASSIUM SERPL-MCNC: 4.4 MMOL/L — SIGNIFICANT CHANGE UP (ref 3.5–5.3)
POTASSIUM SERPL-SCNC: 4.4 MMOL/L — SIGNIFICANT CHANGE UP (ref 3.5–5.3)
RBC # BLD: 4.4 M/UL — SIGNIFICANT CHANGE UP (ref 4.2–5.8)
RBC # FLD: 13.1 % — SIGNIFICANT CHANGE UP (ref 10.3–14.5)
SODIUM SERPL-SCNC: 136 MMOL/L — SIGNIFICANT CHANGE UP (ref 135–145)
TRIGL SERPL-MCNC: 32 MG/DL — SIGNIFICANT CHANGE UP
WBC # BLD: 6.91 K/UL — SIGNIFICANT CHANGE UP (ref 3.8–10.5)
WBC # FLD AUTO: 6.91 K/UL — SIGNIFICANT CHANGE UP (ref 3.8–10.5)

## 2024-07-12 PROCEDURE — 99285 EMERGENCY DEPT VISIT HI MDM: CPT

## 2024-07-12 PROCEDURE — 96374 THER/PROPH/DIAG INJ IV PUSH: CPT

## 2024-07-12 PROCEDURE — 99239 HOSP IP/OBS DSCHRG MGMT >30: CPT

## 2024-07-12 PROCEDURE — 83036 HEMOGLOBIN GLYCOSYLATED A1C: CPT

## 2024-07-12 PROCEDURE — 82550 ASSAY OF CK (CPK): CPT

## 2024-07-12 PROCEDURE — 80061 LIPID PANEL: CPT

## 2024-07-12 PROCEDURE — 85027 COMPLETE CBC AUTOMATED: CPT

## 2024-07-12 PROCEDURE — 80076 HEPATIC FUNCTION PANEL: CPT

## 2024-07-12 PROCEDURE — 71046 X-RAY EXAM CHEST 2 VIEWS: CPT

## 2024-07-12 PROCEDURE — 83735 ASSAY OF MAGNESIUM: CPT

## 2024-07-12 PROCEDURE — 81003 URINALYSIS AUTO W/O SCOPE: CPT

## 2024-07-12 PROCEDURE — 36415 COLL VENOUS BLD VENIPUNCTURE: CPT

## 2024-07-12 PROCEDURE — 93005 ELECTROCARDIOGRAM TRACING: CPT

## 2024-07-12 PROCEDURE — 83880 ASSAY OF NATRIURETIC PEPTIDE: CPT

## 2024-07-12 PROCEDURE — 80048 BASIC METABOLIC PNL TOTAL CA: CPT

## 2024-07-12 PROCEDURE — 84484 ASSAY OF TROPONIN QUANT: CPT

## 2024-07-12 PROCEDURE — 93306 TTE W/DOPPLER COMPLETE: CPT

## 2024-07-12 PROCEDURE — 70450 CT HEAD/BRAIN W/O DYE: CPT | Mod: MC

## 2024-07-12 PROCEDURE — 85025 COMPLETE CBC W/AUTO DIFF WBC: CPT

## 2024-07-12 PROCEDURE — 80307 DRUG TEST PRSMV CHEM ANLYZR: CPT

## 2024-07-12 RX ORDER — HYDRALAZINE HYDROCHLORIDE 50 MG/1
25 TABLET ORAL ONCE
Refills: 0 | Status: COMPLETED | OUTPATIENT
Start: 2024-07-12 | End: 2024-07-12

## 2024-07-12 RX ORDER — HYDRALAZINE HYDROCHLORIDE 50 MG/1
3 TABLET ORAL
Qty: 270 | Refills: 0
Start: 2024-07-12 | End: 2024-08-10

## 2024-07-12 RX ORDER — ACETAMINOPHEN 325 MG
650 TABLET ORAL ONCE
Refills: 0 | Status: COMPLETED | OUTPATIENT
Start: 2024-07-12 | End: 2024-07-12

## 2024-07-12 RX ORDER — ASPIRIN 325 MG/1
1 TABLET, FILM COATED ORAL
Qty: 30 | Refills: 0
Start: 2024-07-12 | End: 2024-08-10

## 2024-07-12 RX ORDER — ATORVASTATIN CALCIUM 20 MG/1
1 TABLET, FILM COATED ORAL
Qty: 30 | Refills: 0
Start: 2024-07-12 | End: 2024-08-10

## 2024-07-12 RX ORDER — HYDRALAZINE HYDROCHLORIDE 50 MG/1
75 TABLET ORAL EVERY 8 HOURS
Refills: 0 | Status: DISCONTINUED | OUTPATIENT
Start: 2024-07-12 | End: 2024-07-12

## 2024-07-12 RX ADMIN — Medication 650 MILLIGRAM(S): at 14:10

## 2024-07-12 RX ADMIN — ASPIRIN 81 MILLIGRAM(S): 325 TABLET, FILM COATED ORAL at 09:41

## 2024-07-12 RX ADMIN — Medication 650 MILLIGRAM(S): at 05:35

## 2024-07-12 RX ADMIN — HYDRALAZINE HYDROCHLORIDE 25 MILLIGRAM(S): 50 TABLET ORAL at 09:42

## 2024-07-12 RX ADMIN — Medication 650 MILLIGRAM(S): at 10:45

## 2024-07-12 RX ADMIN — Medication 650 MILLIGRAM(S): at 15:07

## 2024-07-12 RX ADMIN — HYDRALAZINE HYDROCHLORIDE 50 MILLIGRAM(S): 50 TABLET ORAL at 05:35

## 2024-07-12 RX ADMIN — Medication 650 MILLIGRAM(S): at 09:48

## 2024-07-12 RX ADMIN — HYDRALAZINE HYDROCHLORIDE 75 MILLIGRAM(S): 50 TABLET ORAL at 13:16

## 2024-07-12 RX ADMIN — Medication 650 MILLIGRAM(S): at 06:30

## 2024-07-12 NOTE — DISCHARGE NOTE NURSING/CASE MANAGEMENT/SOCIAL WORK - PATIENT PORTAL LINK FT
You can access the FollowMyHealth Patient Portal offered by [facility] by registering at the following website: [email or web address]. By joining [employer]’s FollowMyHealth portal, you will also be able to view your health information using other applications (apps) compatible with our system.

## 2024-07-12 NOTE — DISCHARGE NOTE NURSING/CASE MANAGEMENT/SOCIAL WORK - NSDCPEPT PROEDMA_GEN_ALL_CORE
Nurse review: Monekypox and Jynneos vaccine administration criteria:     Criteria for Jynneos vaccine reviewed via In person.     Reviewed Jynneos vaccine criteria Yes  and patient is a candidate for the vaccine: Yes     Patient does not have symptoms of monkeypox: Yes. If patient has symptoms of monkeypox then a provider visit should be scheduled and vaccination not administered.    Patient does not have contraindications to the monkeypox vaccine: Yes.Contraindications for vaccine include:   Allergies to a Jynneos vaccine or to an ingredient in the the vaccine (gentamicin, ciprofloxacin,egg protein).    Date of vaccine: 11/28/23   
Yes

## 2024-07-12 NOTE — DISCHARGE NOTE NURSING/CASE MANAGEMENT/SOCIAL WORK - NSDCPEFALRISK_GEN_ALL_CORE
For information on Fall & Injury Prevention, visit: [email or web address] OR  [email or web address] OR  https://www.cdc.gov/steadi/patient.html

## 2024-07-23 DIAGNOSIS — I10 ESSENTIAL (PRIMARY) HYPERTENSION: ICD-10-CM

## 2024-07-23 DIAGNOSIS — I25.10 ATHEROSCLEROTIC HEART DISEASE OF NATIVE CORONARY ARTERY WITHOUT ANGINA PECTORIS: ICD-10-CM

## 2024-07-23 DIAGNOSIS — E78.5 HYPERLIPIDEMIA, UNSPECIFIED: ICD-10-CM

## 2024-07-23 DIAGNOSIS — N18.30 CHRONIC KIDNEY DISEASE, STAGE 3 UNSPECIFIED: ICD-10-CM

## 2024-07-23 DIAGNOSIS — K08.89 OTHER SPECIFIED DISORDERS OF TEETH AND SUPPORTING STRUCTURES: ICD-10-CM

## 2024-07-23 DIAGNOSIS — F10.11 ALCOHOL ABUSE, IN REMISSION: ICD-10-CM

## 2024-07-23 DIAGNOSIS — G56.01 CARPAL TUNNEL SYNDROME, RIGHT UPPER LIMB: ICD-10-CM

## 2024-07-23 DIAGNOSIS — Z90.79 ACQUIRED ABSENCE OF OTHER GENITAL ORGAN(S): ICD-10-CM

## 2024-07-23 DIAGNOSIS — J45.909 UNSPECIFIED ASTHMA, UNCOMPLICATED: ICD-10-CM

## 2024-07-23 DIAGNOSIS — F14.188 COCAINE ABUSE WITH OTHER COCAINE-INDUCED DISORDER: ICD-10-CM

## 2024-07-23 DIAGNOSIS — Z86.73 PERSONAL HISTORY OF TRANSIENT ISCHEMIC ATTACK (TIA), AND CEREBRAL INFARCTION WITHOUT RESIDUAL DEFICITS: ICD-10-CM

## 2024-07-23 DIAGNOSIS — I50.9 HEART FAILURE, UNSPECIFIED: ICD-10-CM

## 2024-07-23 DIAGNOSIS — F32.A DEPRESSION, UNSPECIFIED: ICD-10-CM

## 2024-07-23 DIAGNOSIS — F19.188 OTHER PSYCHOACTIVE SUBSTANCE ABUSE WITH OTHER PSYCHOACTIVE SUBSTANCE-INDUCED DISORDER: ICD-10-CM

## 2024-07-23 DIAGNOSIS — I25.2 OLD MYOCARDIAL INFARCTION: ICD-10-CM

## 2024-07-23 DIAGNOSIS — I16.0 HYPERTENSIVE URGENCY: ICD-10-CM

## 2024-07-23 DIAGNOSIS — Z91.148 PATIENT'S OTHER NONCOMPLIANCE WITH MEDICATION REGIMEN FOR OTHER REASON: ICD-10-CM

## 2024-07-23 DIAGNOSIS — Z90.49 ACQUIRED ABSENCE OF OTHER SPECIFIED PARTS OF DIGESTIVE TRACT: ICD-10-CM

## 2024-07-23 DIAGNOSIS — Z79.82 LONG TERM (CURRENT) USE OF ASPIRIN: ICD-10-CM

## 2024-07-23 DIAGNOSIS — N17.9 ACUTE KIDNEY FAILURE, UNSPECIFIED: ICD-10-CM

## 2024-07-23 DIAGNOSIS — R51.9 HEADACHE, UNSPECIFIED: ICD-10-CM

## 2024-07-23 DIAGNOSIS — Z59.01 SHELTERED HOMELESSNESS: ICD-10-CM

## 2024-07-23 DIAGNOSIS — Z85.46 PERSONAL HISTORY OF MALIGNANT NEOPLASM OF PROSTATE: ICD-10-CM

## 2024-07-23 DIAGNOSIS — F17.210 NICOTINE DEPENDENCE, CIGARETTES, UNCOMPLICATED: ICD-10-CM

## 2024-07-23 DIAGNOSIS — I13.0 HYPERTENSIVE HEART AND CHRONIC KIDNEY DISEASE WITH HEART FAILURE AND STAGE 1 THROUGH STAGE 4 CHRONIC KIDNEY DISEASE, OR UNSPECIFIED CHRONIC KIDNEY DISEASE: ICD-10-CM

## 2024-07-23 SDOH — ECONOMIC STABILITY - HOUSING INSECURITY: SHELTERED HOMELESSNESS: Z59.01

## 2024-08-23 NOTE — ED ADULT NURSE NOTE - NSSISCREENINGQ2_ED_A_ED
Provider: Dario  Caller: Kate  Relationship to Patient: friend  Call Back Phone Number: [phone]  Reason for Call: she is calling to let MD know that he does want to have Guardant testing  done     Yes

## 2024-10-29 NOTE — ED ADULT TRIAGE NOTE - GLASGOW COMA SCALE: EYE OPENING, MLM
Patient requests all Lab, Cardiology, and Radiology Results on their Discharge Instructions (E4) spontaneous

## 2024-11-19 NOTE — ED BEHAVIORAL HEALTH ASSESSMENT NOTE - PATIENT SEEN BY
----- Message from Christa sent at 11/19/2024  2:31 PM CST -----  Contact: Mom Yuki   Mom would like a call back with questions about a form she needs signed by the doctor with his Autism diagnosis    Spoke to mom, you can drop the off to us or you can send it via the portal. Mom said I will drop it off.   Attending Psychiatrist without NP/Trainee

## 2025-01-10 NOTE — ED PROVIDER NOTE - MDM ORDERS SUBMITTED SELECTION
Discontinue Regimen: triamcinolone acetonide 0.1 % topical ointment\\nQuantity: 80 gDays Supply: 30\\nRefills: 2\\nDispense as Written\\nSig: Apply thin layer to arms and hands QHS. Plan: Dr. Mejia Recommend heliocare QD and niacinamide 500mg BID with food Render In Strict Bullet Format?: No Continue Regimen: Efudex 5 % topical cream\\nQuantity: 80 gDays Supply: 30\\nRefills: 1\\nAllow Substitutions\\nSig: Apply thin layer to arms and hands twice day for 2 weeks Do not apply to biopsy sites. Detail Level: Zone Imaging Studies/Medications

## 2025-04-28 ENCOUNTER — EMERGENCY (EMERGENCY)
Facility: HOSPITAL | Age: 65
LOS: 1 days | End: 2025-04-28
Attending: STUDENT IN AN ORGANIZED HEALTH CARE EDUCATION/TRAINING PROGRAM | Admitting: STUDENT IN AN ORGANIZED HEALTH CARE EDUCATION/TRAINING PROGRAM
Payer: MEDICAID

## 2025-04-28 VITALS
WEIGHT: 160.06 LBS | HEART RATE: 60 BPM | OXYGEN SATURATION: 97 % | SYSTOLIC BLOOD PRESSURE: 170 MMHG | TEMPERATURE: 99 F | DIASTOLIC BLOOD PRESSURE: 85 MMHG | HEIGHT: 66 IN | RESPIRATION RATE: 18 BRPM

## 2025-04-28 DIAGNOSIS — Z90.49 ACQUIRED ABSENCE OF OTHER SPECIFIED PARTS OF DIGESTIVE TRACT: Chronic | ICD-10-CM

## 2025-04-28 PROCEDURE — 99282 EMERGENCY DEPT VISIT SF MDM: CPT

## 2025-04-28 PROCEDURE — 99284 EMERGENCY DEPT VISIT MOD MDM: CPT

## 2025-04-28 RX ORDER — PERMETHRIN 50 MG/G
1 CREAM TOPICAL
Qty: 1 | Refills: 1
Start: 2025-04-28

## 2025-04-28 NOTE — ED PROVIDER NOTE - OBJECTIVE STATEMENT
64M here for itching x 1 day. Lives in a shelter. Started in feet and now in hands and arms. No new medications or allergies.

## 2025-04-28 NOTE — ED ADULT TRIAGE NOTE - CHIEF COMPLAINT QUOTE
Pt presents to ED here for generalized body itching, pt reports started on his feet and last night spread to his body. PT reports he lives in a shelter.

## 2025-04-28 NOTE — ED ADULT NURSE NOTE - OBJECTIVE STATEMENT
pt reports x1day of itchiness, started in feet and now reports in BL hands and arms. pt states he lives in a shelter. denies CP, SOB, lip/tongue swelling, abdoinal pain, back pain. A&Ox4. Breathing spontaneously and unlabored on room air.

## 2025-04-28 NOTE — ED ADULT TRIAGE NOTE - ESI TRIAGE ACUITY LEVEL, MLM
Hospital Medicine History & Physical      PCP: Stephanie Irwin MD    Date of Admission: 11/6/2021    Date of Service: Pt seen/examined on 11/6/2021 and Admitted to observation with expected LOS less than two midnights due to medical therapy. Chief Complaint:  seizure    History Of Present Illness:     47 y.o. female, with PMH of epilepsy and tobacco use, who was a direct admit from [facility] to [facility] with seizure. History obtained from the patient and review of EMR. The patient was being apprehended after shoplifting at [employer] when she fell to the ground and started having what appeared to be a seizure. It was reported that the patient was unresponsive and shaking. Her symptoms terminated after several seconds, but the patient appeared to be in a postical period afterwards. The patient stated she has had multiple seizures over the last week. She stated she is on tegretol and has been taking as prescribed. However, she stated she had the \"stomach bug\" for 3 days recently and was puking them up. While in the Emergency room, the patient had another seizure that lasted roughly 30 seconds. She was given 2 mg IV ativan and was somnolent following. A CT head was obtained that revealed no acute intracranial abnormality. She was ultimately transferred to [facility] for further evaluation and treatment. Neurology has been consulted. The patient denied any other associated symptoms as well as any aggravating and/or alleviating factors. At the time of this assessment, the patient was resting comfortably in bed. She currently denies any chest pain, back pain, abdominal pain, shortness of breath, numbness, tingling, N/V/C/D, fever and/or chills. Of note, the police are to be notified prior to discharge.      Past Medical History:          Diagnosis Date    Chronic kidney disease     Epilepsy ([address]     Gastroparesis     Gastroparesis     GERD (gastroesophageal reflux disease)     Kidney stones     Migraine     Mitral valve disease     Mitral valve prolapse     Movement disorder     MS    MS (multiple sclerosis) (HCC)     Neuromuscular disorder (HCC)     Trigeminal neuralgia    Seizures (HCC)     Trigeminal neuralgia      Past Surgical History:          Procedure Laterality Date    APPENDECTOMY      BRAIN SURGERY      surgery to treat TN    CERVIX BIOPSY      cervical cone biopsy    CHOLECYSTECTOMY      HYSTERECTOMY      KNEE ARTHROSCOPY  2010    left    LAPAROSCOPY  07/30/2012    diagnostic    NERVE BLOCK      optical    NERVE BLOCK      \"occipital block\"    OTHER SURGICAL HISTORY  3/29/12    blood patch for spinal headache    TONSILLECTOMY      URETHRAL STRICTURE DILATATION       Medications Prior to Admission:      Prior to Admission medications    Medication Sig Start Date End Date Taking? Authorizing Provider   carBAMazepine (TEGRETOL) 200 MG tablet Take 2 tablets by mouth 3 times daily Pt allergic to generic version 11/10/19   Yanelis Chan, DO   topiramate (TOPAMAX) 25 MG tablet Take 3 tablets by mouth 2 times daily  Patient taking differently: Take 125 mg by mouth 2 times daily  11/10/19   Yanelis Chan,    SUMAtriptan (IMITREX) 25 MG tablet Take 1 tablet by mouth once as needed for Migraine  Patient taking differently: Take 100 mg by mouth once as needed for Migraine  10/24/18 9/3/21  Cely Wlals PA-C   albuterol sulfate HFA (VENTOLIN HFA) 108 (90 BASE) MCG/ACT inhaler Inhale 2 puffs into the lungs every 4 hours as needed for Wheezing 7/4/16   Laxmi Espinoza MD   pantoprazole (PROTONIX) 40 MG tablet Take 1 tablet by mouth daily 1/26/16   Eduardo Arzola MD   baclofen (LIORESAL) 10 MG tablet   Take 10 mg by mouth daily     Historical Provider, MD   metoclopramide (REGLAN) 10 MG tablet Take 10 mg by mouth daily.     Historical Provider, MD     Allergies:  Carbamazepine and analogs, Ketorolac tromethamine, Levaquin [levofloxacin in d5w], Morphine, Penicillins, Prochlorperazine edisylate, Vancomycin, Albolene, and Depakote [divalproex sodium]    Social History:      The patient currently lives at home    TOBACCO:   reports that she has been smoking cigarettes. She has a 10.00 pack-year smoking history. She has never used smokeless tobacco.  ETOH:   reports current alcohol use. E-Cigarettes/Vaping Use     Questions Responses    E-Cigarette/Vaping Use Never User    Start Date     Passive Exposure     Quit Date     Counseling Given     Comments         Family History:      Reviewed in detail. Positive as follows:        Problem Relation Age of Onset    Diabetes Mother     Coronary Art Dis Mother     Dementia Father     Parkinsonism Father     Diabetes Maternal Aunt     Mental Illness Maternal Grandmother         grandmpther alzheimer     REVIEW OF SYSTEMS COMPLETED:   Pertinent positives as noted in the HPI. All other systems reviewed and negative. PHYSICAL EXAM PERFORMED:    /79   Pulse 75   Temp 98.2 °F (36.8 °C)   LMP 01/01/2003   SpO2 100%     General appearance:  Pleasant female in no apparent distress, appears stated age and cooperative. HEENT: Pupils equal, round, and reactive to light. Extra ocular muscles intact. Conjunctivae/corneas clear. Neck: Supple, with full range of motion. No jugular venous distention. Trachea midline. Respiratory:  Normal respiratory effort. Clear to auscultation, bilaterally without Rales/Wheezes/Rhonchi. Cardiovascular:  Regular rate and rhythm with normal S1/S2 without murmurs, rubs or gallops. Abdomen: Soft, non-tender, non-distended with normal bowel sounds. Musculoskeletal:  No clubbing, cyanosis or edema bilaterally. Full range of motion without deformity. Skin: Skin color, texture, turgor normal.  No significant rashes or lesions. Neurologic:  Neurovascularly intact.  Cranial nerves: II-XII intact, grossly non-focal.  Psychiatric:  Alert and oriented, thought content appropriate, normal insight  Capillary Refill: Brisk,3 seconds, normal  Peripheral Pulses: +2 palpable, equal bilaterally     Labs:     Recent Labs     11/06/21  1446   WBC 6.4   HGB 13.6   HCT 41.0        Recent Labs     11/06/21  1446      K 4.7      CO2 22   BUN 10   CREATININE 0.6   CALCIUM 9.2     Urinalysis:      Lab Results   Component Value Date    NITRU Negative 11/06/2021    WBCUA 0-2 11/06/2021    BACTERIA Rare 09/03/2021    RBCUA 3-4 11/06/2021    BLOODU TRACE-LYSED 11/06/2021    SPECGRAV 1.020 11/06/2021    GLUCOSEU Negative 11/06/2021    GLUCOSEU NEGATIVE 04/03/2012     Radiology:     CXR: I have reviewed the CXR with the following interpretation: N/A    EKG:  I have reviewed the EKG with the following interpretation: The Ekg interpreted in the absence of a cardiologist shows Normal sinus rhythmProlonged QTAbnormal ECGNo previous ECGs available     No orders to display     ASSESSMENT:    DOUG/B[address] Problems    Diagnosis Date Noted    Tobacco use [Z72.0] 11/06/2021    Epilepsy, focal ([facility] [address] [T57.423] 04/30/2013    Seizures [R56.9] 04/15/2012     PLAN:    Seizure in patient with known epilepsy  -CT head revealed: no acute intracranial abnormality  -UDS positive for amphetamines  -IV ativan given in ED  -UA negative for infection  -carbamazepine level < 2.0  -continue carbamazepine  -neurology consult - appreciate recommendations in advance  -seizure precautions  -prn ativan  -neuro checks    Tobacco use  -tobacco cessation   -nicotine patch    DVT Prophylaxis: Lovenox    Diet: No diet orders on file     Code Status: Prior    PT/OT Eval Status: No indication for need at this time    Dispo - 1-2 days pending clinical improvement     Vicenta Mills, YANI - CNP    Thank you Porter Rose MD for the opportunity to be involved in this patient's care.  If you have any questions or concerns please feel free to contact me at [phone].  --------------------Anticipated Dr. Adriel tomas ------------------------------- 4

## 2025-04-28 NOTE — ED PROVIDER NOTE - NSFOLLOWUPINSTRUCTIONS_ED_ALL_ED_FT
Apply cream as instructed.    Rash, Adult  Heat rash on a person's hand.   A rash is a breakout of spots or blotches on the skin. It can affect the way the skin looks and feels. Many things can cause a rash. Common causes include:  Viral infections. These include colds, measles, and hand, foot, and mouth disease.  Bacterial infections. These include scarlet fever and impetigo.  Fungal infections. These include athlete's foot, ringworm, and yeast rashes.  Skin irritation. This may be from heat rash, exposure to moisture or friction for a long time (intertrigo), or exposure to soap or skin care products (eczema).  Allergic reactions. These may be caused by foods, medicines, or things like poison ivy.  Some rashes may go away after a few days. Others may last for a few weeks. The goal of treatment is to stop the itching and keep the rash from spreading.    Follow these instructions at home:  Medicine    A tube of ointment.  Take or apply over-the-counter and prescription medicines only as told by your health care provider. These may include:  Corticosteroids. These can help treat red or swollen skin. They may be given as creams or as medicines to take by mouth (orally).  Anti-itch lotions.  Allergy medicines.  Pain medicine.  Antifungal medicine if the rash is from a fungal infection.  Antibiotics if you have an infection.  Skin care    Apply cool, wet cloths (compresses) to the affected areas.  Do not scratch or rub your skin.  Avoid covering the rash. Keep it exposed to air as often as you can.  Managing itching and discomfort    Avoid hot showers and baths. These can make itching worse. A cold shower may help.  Try taking a bath with:  Epsom salts. You can get these at your local pharmacy or grocery store. Follow the instructions on the package.  Baking soda. Pour a small amount into the bath as told by your provider.  Colloidal oatmeal. You can get this at your local pharmacy or grocery store. Follow the instructions on the package.  Try putting baking soda paste on your skin. Stir water into baking soda until it becomes like a paste.  Try using calamine lotion or cortisone cream to help with itchiness.  Keep cool. Stay out of the sun. Sweating and being hot can make itching worse.  General instructions    A person writing in a journal.  Rest as needed.  Drink enough fluid to keep your pee (urine) pale yellow.  Wear loose-fitting clothes.  Avoid scented soaps, detergents, and perfumes. Use gentle soaps, detergents, perfumes, and cosmetics.  Avoid the things that cause your rash (triggers). Keep a journal to help keep track of your triggers. Write down:  What you eat.  What cosmetics you use.  What you drink.  What you wear. This includes jewelry.  Contact a health care provider if:  You sweat at night more than normal.  You pee (urinate) more or less than normal, or your pee is a darker color than normal.  Your eyes become sensitive to light.  Your skin or the white parts of your eyes turn yellow (jaundice).  Your skin tingles or is numb.  You get painful blisters in your nose or mouth.  Your rash does not go away after a few days, or it gets worse.  You are more tired or thirsty than normal.  You have new or worse symptoms. These may include:  Pain in your abdomen.  Fever.  Diarrhea or vomiting.  Weakness or weight loss.  Get help right away if:  You get confused.  You have a severe headache, a stiff neck, or severe joint pain or stiffness.  You become very sleepy or not responsive.  You have a seizure.  This information is not intended to replace advice given to you by your health care provider. Make sure you discuss any questions you have with your health care provider.

## 2025-04-28 NOTE — ED PROVIDER NOTE - CLINICAL SUMMARY MEDICAL DECISION MAKING FREE TEXT BOX
64M here for itching x 1 day. Lives in a shelter. Started in feet and now in hands and arms. No new medications or allergies.     Possibly scabies. No allergic sxs. Will rx permethrin.

## 2025-04-28 NOTE — ED PROVIDER NOTE - PATIENT PORTAL LINK FT
You can access the FollowMyHealth Patient Portal offered by [facility] by registering at the following website: [email or web address]. By joining [employer]’s FollowMyHealth portal, you will also be able to view your health information using other applications (apps) compatible with our system.

## 2025-04-28 NOTE — ED ADULT NURSE NOTE - CCCP TRG CHIEF CMPLNT
SVE-no change.  Pt is requesting epidural and is okay with pitocin augmentation at this time.  Dr. Chandler updated.  Orders received and anesthesia aware of epidural placement.    itching

## 2025-04-29 ENCOUNTER — EMERGENCY (EMERGENCY)
Facility: HOSPITAL | Age: 65
LOS: 1 days | End: 2025-04-29
Admitting: EMERGENCY MEDICINE
Payer: MEDICAID

## 2025-04-29 VITALS
WEIGHT: 160.06 LBS | OXYGEN SATURATION: 98 % | HEIGHT: 66 IN | TEMPERATURE: 98 F | DIASTOLIC BLOOD PRESSURE: 89 MMHG | SYSTOLIC BLOOD PRESSURE: 147 MMHG | RESPIRATION RATE: 16 BRPM | HEART RATE: 87 BPM

## 2025-04-29 DIAGNOSIS — Z90.49 ACQUIRED ABSENCE OF OTHER SPECIFIED PARTS OF DIGESTIVE TRACT: Chronic | ICD-10-CM

## 2025-04-29 PROCEDURE — 99282 EMERGENCY DEPT VISIT SF MDM: CPT

## 2025-04-29 PROCEDURE — 99283 EMERGENCY DEPT VISIT LOW MDM: CPT | Mod: 25

## 2025-04-29 RX ORDER — DIPHENHYDRAMINE HCL 12.5MG/5ML
50 ELIXIR ORAL ONCE
Refills: 0 | Status: COMPLETED | OUTPATIENT
Start: 2025-04-29 | End: 2025-04-29

## 2025-04-29 RX ADMIN — Medication 50 MILLIGRAM(S): at 08:20

## 2025-04-29 NOTE — ED PROVIDER NOTE - NSFOLLOWUPCLINICS_GEN_ALL_ED_FT
[facility]  Family Medicine  [address]  [place], [place] [address]  Phone: [phone]  Fax:

## 2025-04-29 NOTE — ED PROVIDER NOTE - CLINICAL SUMMARY MEDICAL DECISION MAKING FREE TEXT BOX
Pruritic condition of skin for several days. No signs of infection.  Possibly allergic, no resp complaints or airway issues.  Permethrin did not help.  Pt unable to sleep last night due to severity of itchiness, want something that will allow him to get some relief and rest today.  Will give a dose of Benadryl PO; advise him to start a second generation antihistamine later today/tomorrow.

## 2025-04-29 NOTE — ED PROVIDER NOTE - PATIENT PORTAL LINK FT
You can access the FollowMyHealth Patient Portal offered by [facility] by registering at the following website: [email or web address]. By joining [employer]’s FollowMyHealth portal, you will also be able to view your health information using other applications (apps) compatible with our system.

## 2025-04-29 NOTE — ED ADULT TRIAGE NOTE - IDEAL BODY WEIGHT(KG)
Problem: Cardiac Catheterization (Diagnostic/Interventional)  Goal: Absence of Bleeding  Outcome: Progressing     Problem: Cardiac Catheterization (Diagnostic/Interventional)  Goal: Stable Heart Rate and Rhythm  Outcome: Progressing     Problem: Cardiac Catheterization (Diagnostic/Interventional)  Goal: Optimal Pain Control and Function  Outcome: Progressing     Problem: Cardiac Catheterization (Diagnostic/Interventional)  Goal: Absence of Vascular Access Complication  Outcome: Progressing  Intervention: Prevent and Manage Access Complications  Recent Flowsheet Documentation  Taken 2/21/2025 0410 by Anna Marie King, RN  Activity Management: activity adjusted per tolerance  Head of Bed (HOB) Positioning: HOB at 20-30 degrees  Taken 2/21/2025 0010 by Anna Marie King, RN  Activity Management: activity adjusted per tolerance  Head of Bed (HOB) Positioning: HOB at 20-30 degrees  Taken 2/20/2025 2010 by Anna Marie King, RN  Activity Management: ambulated to bathroom  Head of Bed (HOB) Positioning: HOB at 20-30 degrees     Goal Outcome Evaluation:  Pt denies pain. Right radial site WDL. Pulses intact. NSR/SB. O2 sats in the upper 90s on RA. Up independently to bathroom. A/O. VSS. Will continue to monitor and notify MD of any changes.      65

## 2025-04-29 NOTE — ED PROVIDER NOTE - CARE PROVIDER_API CALL
Gayathri Strong  Dermatology  [address] [place] [address], [address]  [place] [address]  Phone: ([phone]  Fax: ()-  Follow Up Time:     Ashlee St  Dermatology  [address] [place] [address], [place] [address]  Phone: [phone]  Fax: [phone]  Follow Up Time:

## 2025-04-29 NOTE — ED PROVIDER NOTE - OBJECTIVE STATEMENT
64 m PMHX HTN CHF - seen here yesterday for itchiness, concern for possible infestation and prescribed permethrin.  Also using benadryl cream and tried clotrimazole cream.  Pt applied all over body and washed off after 8-10 hours, but itchiness persists/worsening today. Lives in  a shelter, is not aware of any new laundry detergents.  No new meds/foods.

## 2025-04-29 NOTE — ED ADULT TRIAGE NOTE - TEMP AT ED ARRIVAL (C)
36.7 MSW phoned [facility] at [phone] and spoke with Chloe - he stated that patient's application for Ajovy has been approved from 4/21/25-4/21/26. Chloe stated that their pharmacy, [facility], will be calling patient to schedule delivery.     MSW attempted to reach patient at [phone] to make her aware of above. No answer. MSW left a message requesting callback. Awaiting same.

## 2025-04-29 NOTE — ED ADULT NURSE NOTE - NSFALLUNIVINTERV_ED_ALL_ED
Bed/Stretcher in lowest position, wheels locked, appropriate side rails in place/Call bell, personal items and telephone in reach/Instruct patient to call for assistance before getting out of bed/chair/stretcher/Non-slip footwear applied when patient is off stretcher/[place] to call system/Physically safe environment - no spills, clutter or unnecessary equipment/Purposeful proactive rounding/Room/bathroom lighting operational, light cord in reach

## 2025-04-29 NOTE — ED PROVIDER NOTE - PHYSICAL EXAMINATION
CONSTITUTIONAL: pt in NAD; appears uncomfortable, scratching legs frequently  SKIN: Normal color and turgor; few areas of skin hyperemia/urticaria.  Few minor excoriations on legs.  No burrows appreciated on webspaces of hands/feet.  HEAD: NC/AT.  EYES: Conjunctiva clear. Anicteric sclera.  ENT: Airway clear. Normal voice. MMM.  RESPIRATORY:  Normal respiratory rate and effort. Lungs CTA  CARDIOVASCULAR:  RRR S1S2, no MRG  GI:  Abdomen soft, nontender.    MSK: Neck supple.  No limb edema or muscular tenderness. No joint swelling or ROM limitation.  NEURO: Alert, clear mental status.  Speech clear. No focal deficits. Gait steady.

## 2025-04-29 NOTE — ED PROVIDER NOTE - NSFOLLOWUPINSTRUCTIONS_ED_ALL_ED_FT
The cause of your itchiness is unclear, but likely allergic.     You were given a dose of Benadryl here.  You can start a different type of antihistamine later today or tomorrow (cetirizine).    Please follow up with doctor at [facility], family practice clinic, or dermatologist.  Return to the Emergency Department if you have any new or worsening symptoms, or if you have any concerns.  ================  Pruritus  Pruritus is an itchy feeling on the skin. One of the most common causes is dry skin, but many different things can cause itching. Most cases of itching do not require medical attention. Sometimes itchy skin can turn into a rash or a secondary infection.    Follow these instructions at home:  Skin care    A person applying skin lotion moisturizer to the hands.  Do not use scented soaps, detergents, perfumes, and cosmetic products. Instead, use gentle, unscented versions of these items.  Apply moisturizing creams to your skin frequently, at least twice daily. Apply immediately after bathing while skin is still wet.  Take medicines or apply medicated creams only as told by your health care provider. This may include:  Corticosteroid cream or topical calcineurin inhibitor.  Anti-itch lotions containing urea, camphor, or menthol.  Oral antihistamines.  Do not take hot showers or baths, which can make itching worse. A short, cool shower may help with itching as long as you apply moisturizing lotion after the shower.  Apply a cool, wet cloth (cool compress) to the affected areas.  You may take lukewarm baths with one of the following:  Epsom salts. You can get these at your local pharmacy or grocery store. Follow the instructions on the packaging.  Baking soda. Pour a small amount into the bath as told by your health care provider.  Colloidal oatmeal. You can get this at your local pharmacy or grocery store. Follow the instructions on the packaging.  Do not scratch your skin.  General instructions    Avoid wearing tight clothes.  Keep a journal to help find out what is causing your itching. Write down:  What you eat and drink.  What cosmetic products you use.  What soaps or detergents you use.  What you wear, including jewelry.  Use a humidifier. This keeps the air moist, which helps to prevent dry skin.  Be aware of any changes in your itchiness. Tell your health care provider about any changes.  Contact a health care provider if:  The itching does not go away after several days.  You notice redness, warmth, or drainage on the skin where you have scratched.  You are unusually thirsty or urinating more than normal.  Your skin tingles or feels numb.  Your skin or the white parts of your eyes turn yellow (jaundice).  You feel weak.  You have any of the following:  Night sweats.  Tiredness (fatigue).  Weight loss.  Abdominal pain.  Summary  Pruritus is an itchy feeling on the skin. One of the most common causes is dry skin, but many different conditions and factors can cause itching.  Apply moisturizing creams to your skin frequently, at least twice daily. Apply immediately after bathing while skin is still wet.  Take medicines or apply medicated creams only as told by your health care provider.  Do not take hot showers or baths. Do not use scented soaps, detergents, perfumes, or cosmetic products.  Keep a journal to help find out what is causing your itching.  This information is not intended to replace advice given to you by your health care provider. Make sure you discuss any questions you have with your health care provider.

## 2025-04-30 DIAGNOSIS — R21 RASH AND OTHER NONSPECIFIC SKIN ERUPTION: ICD-10-CM

## 2025-04-30 DIAGNOSIS — Z59.01 SHELTERED HOMELESSNESS: ICD-10-CM

## 2025-04-30 SDOH — ECONOMIC STABILITY - HOUSING INSECURITY: SHELTERED HOMELESSNESS: Z59.01

## 2025-05-02 DIAGNOSIS — I11.0 HYPERTENSIVE HEART DISEASE WITH HEART FAILURE: ICD-10-CM

## 2025-05-02 DIAGNOSIS — I50.9 HEART FAILURE, UNSPECIFIED: ICD-10-CM

## 2025-05-02 DIAGNOSIS — L29.9 PRURITUS, UNSPECIFIED: ICD-10-CM
